# Patient Record
Sex: FEMALE | Race: WHITE | Employment: UNEMPLOYED | URBAN - NONMETROPOLITAN AREA
[De-identification: names, ages, dates, MRNs, and addresses within clinical notes are randomized per-mention and may not be internally consistent; named-entity substitution may affect disease eponyms.]

---

## 2021-10-08 ENCOUNTER — APPOINTMENT (OUTPATIENT)
Dept: CT IMAGING | Age: 81
DRG: 603 | End: 2021-10-08
Payer: MEDICARE

## 2021-10-08 ENCOUNTER — HOSPITAL ENCOUNTER (INPATIENT)
Age: 81
LOS: 3 days | Discharge: HOME | DRG: 603 | End: 2021-10-11
Attending: EMERGENCY MEDICINE | Admitting: INTERNAL MEDICINE
Payer: MEDICARE

## 2021-10-08 DIAGNOSIS — D72.819 LEUKOPENIA, UNSPECIFIED TYPE: ICD-10-CM

## 2021-10-08 DIAGNOSIS — D69.6 THROMBOCYTOPENIA (HCC): Primary | ICD-10-CM

## 2021-10-08 DIAGNOSIS — L03.211 FACIAL CELLULITIS: ICD-10-CM

## 2021-10-08 PROBLEM — A46 ERYSIPELAS: Status: ACTIVE | Noted: 2021-10-08

## 2021-10-08 LAB
ALBUMIN SERPL-MCNC: 4.1 G/DL (ref 3.5–5.1)
ALP BLD-CCNC: 55 U/L (ref 38–126)
ALT SERPL-CCNC: 12 U/L (ref 11–66)
ANION GAP SERPL CALCULATED.3IONS-SCNC: 12 MEQ/L (ref 8–16)
AST SERPL-CCNC: 17 U/L (ref 5–40)
BASOPHILS # BLD: 0.3 %
BASOPHILS ABSOLUTE: 0 THOU/MM3 (ref 0–0.1)
BILIRUB SERPL-MCNC: 0.5 MG/DL (ref 0.3–1.2)
BILIRUBIN DIRECT: < 0.2 MG/DL (ref 0–0.3)
BUN BLDV-MCNC: 15 MG/DL (ref 7–22)
C-REACTIVE PROTEIN: 11 MG/DL (ref 0–1)
CALCIUM SERPL-MCNC: 9.1 MG/DL (ref 8.5–10.5)
CHLORIDE BLD-SCNC: 101 MEQ/L (ref 98–111)
CO2: 24 MEQ/L (ref 23–33)
CREAT SERPL-MCNC: 0.7 MG/DL (ref 0.4–1.2)
EOSINOPHIL # BLD: 0.3 %
EOSINOPHILS ABSOLUTE: 0 THOU/MM3 (ref 0–0.4)
ERYTHROCYTE [DISTWIDTH] IN BLOOD BY AUTOMATED COUNT: 13 % (ref 11.5–14.5)
ERYTHROCYTE [DISTWIDTH] IN BLOOD BY AUTOMATED COUNT: 45.5 FL (ref 35–45)
GFR SERPL CREATININE-BSD FRML MDRD: 81 ML/MIN/1.73M2
GLUCOSE BLD-MCNC: 101 MG/DL (ref 70–108)
HCT VFR BLD CALC: 37.4 % (ref 37–47)
HEMOGLOBIN: 12.3 GM/DL (ref 12–16)
IMMATURE GRANS (ABS): 0.01 THOU/MM3 (ref 0–0.07)
IMMATURE GRANULOCYTES: 0.3 %
LACTIC ACID: 1 MMOL/L (ref 0.5–2)
LYMPHOCYTES # BLD: 16.7 %
LYMPHOCYTES ABSOLUTE: 0.5 THOU/MM3 (ref 1–4.8)
MCH RBC QN AUTO: 31.5 PG (ref 26–33)
MCHC RBC AUTO-ENTMCNC: 32.9 GM/DL (ref 32.2–35.5)
MCV RBC AUTO: 95.9 FL (ref 81–99)
MONOCYTES # BLD: 10.8 %
MONOCYTES ABSOLUTE: 0.3 THOU/MM3 (ref 0.4–1.3)
NUCLEATED RED BLOOD CELLS: 0 /100 WBC
OSMOLALITY CALCULATION: 274.8 MOSMOL/KG (ref 275–300)
PLATELET # BLD: 88 THOU/MM3 (ref 130–400)
PLATELET ESTIMATE: ABNORMAL
PMV BLD AUTO: 11.7 FL (ref 9.4–12.4)
POTASSIUM REFLEX MAGNESIUM: 3.7 MEQ/L (ref 3.5–5.2)
RBC # BLD: 3.9 MILL/MM3 (ref 4.2–5.4)
SEDIMENTATION RATE, ERYTHROCYTE: 29 MM/HR (ref 0–20)
SEG NEUTROPHILS: 71.6 %
SEGMENTED NEUTROPHILS ABSOLUTE COUNT: 2.1 THOU/MM3 (ref 1.8–7.7)
SODIUM BLD-SCNC: 137 MEQ/L (ref 135–145)
TOTAL PROTEIN: 7.2 G/DL (ref 6.1–8)
TSH SERPL DL<=0.05 MIU/L-ACNC: 0.99 UIU/ML (ref 0.4–4.2)
WBC # BLD: 2.9 THOU/MM3 (ref 4.8–10.8)

## 2021-10-08 PROCEDURE — 85651 RBC SED RATE NONAUTOMATED: CPT

## 2021-10-08 PROCEDURE — 1200000000 HC SEMI PRIVATE

## 2021-10-08 PROCEDURE — 6370000000 HC RX 637 (ALT 250 FOR IP): Performed by: INTERNAL MEDICINE

## 2021-10-08 PROCEDURE — 86140 C-REACTIVE PROTEIN: CPT

## 2021-10-08 PROCEDURE — 99284 EMERGENCY DEPT VISIT MOD MDM: CPT

## 2021-10-08 PROCEDURE — 96375 TX/PRO/DX INJ NEW DRUG ADDON: CPT

## 2021-10-08 PROCEDURE — 99223 1ST HOSP IP/OBS HIGH 75: CPT | Performed by: INTERNAL MEDICINE

## 2021-10-08 PROCEDURE — 80048 BASIC METABOLIC PNL TOTAL CA: CPT

## 2021-10-08 PROCEDURE — 6360000002 HC RX W HCPCS: Performed by: STUDENT IN AN ORGANIZED HEALTH CARE EDUCATION/TRAINING PROGRAM

## 2021-10-08 PROCEDURE — 6370000000 HC RX 637 (ALT 250 FOR IP): Performed by: EMERGENCY MEDICINE

## 2021-10-08 PROCEDURE — 80076 HEPATIC FUNCTION PANEL: CPT

## 2021-10-08 PROCEDURE — 2500000003 HC RX 250 WO HCPCS: Performed by: INTERNAL MEDICINE

## 2021-10-08 PROCEDURE — 2500000003 HC RX 250 WO HCPCS: Performed by: STUDENT IN AN ORGANIZED HEALTH CARE EDUCATION/TRAINING PROGRAM

## 2021-10-08 PROCEDURE — 84443 ASSAY THYROID STIM HORMONE: CPT

## 2021-10-08 PROCEDURE — 6360000004 HC RX CONTRAST MEDICATION: Performed by: EMERGENCY MEDICINE

## 2021-10-08 PROCEDURE — 70450 CT HEAD/BRAIN W/O DYE: CPT

## 2021-10-08 PROCEDURE — 70491 CT SOFT TISSUE NECK W/DYE: CPT

## 2021-10-08 PROCEDURE — 83605 ASSAY OF LACTIC ACID: CPT

## 2021-10-08 PROCEDURE — 85025 COMPLETE CBC W/AUTO DIFF WBC: CPT

## 2021-10-08 PROCEDURE — 87040 BLOOD CULTURE FOR BACTERIA: CPT

## 2021-10-08 PROCEDURE — 96374 THER/PROPH/DIAG INJ IV PUSH: CPT

## 2021-10-08 PROCEDURE — 2580000003 HC RX 258: Performed by: INTERNAL MEDICINE

## 2021-10-08 PROCEDURE — 36415 COLL VENOUS BLD VENIPUNCTURE: CPT

## 2021-10-08 RX ORDER — ACETAMINOPHEN 500 MG
1000 TABLET ORAL ONCE
Status: COMPLETED | OUTPATIENT
Start: 2021-10-08 | End: 2021-10-08

## 2021-10-08 RX ORDER — DONEPEZIL HYDROCHLORIDE 10 MG/1
10 TABLET, FILM COATED ORAL NIGHTLY
COMMUNITY

## 2021-10-08 RX ORDER — METHYLPREDNISOLONE SODIUM SUCCINATE 125 MG/2ML
125 INJECTION, POWDER, LYOPHILIZED, FOR SOLUTION INTRAMUSCULAR; INTRAVENOUS ONCE
Status: DISCONTINUED | OUTPATIENT
Start: 2021-10-08 | End: 2021-10-08

## 2021-10-08 RX ORDER — POTASSIUM CHLORIDE 20 MEQ/1
20 TABLET, EXTENDED RELEASE ORAL DAILY
Status: DISCONTINUED | OUTPATIENT
Start: 2021-10-09 | End: 2021-10-11 | Stop reason: HOSPADM

## 2021-10-08 RX ORDER — EZETIMIBE 10 MG/1
10 TABLET ORAL DAILY
COMMUNITY

## 2021-10-08 RX ORDER — ACETAMINOPHEN 325 MG/1
650 TABLET ORAL EVERY 6 HOURS PRN
Status: DISCONTINUED | OUTPATIENT
Start: 2021-10-08 | End: 2021-10-11 | Stop reason: HOSPADM

## 2021-10-08 RX ORDER — SODIUM CHLORIDE 9 MG/ML
25 INJECTION, SOLUTION INTRAVENOUS PRN
Status: DISCONTINUED | OUTPATIENT
Start: 2021-10-08 | End: 2021-10-11 | Stop reason: HOSPADM

## 2021-10-08 RX ORDER — DIPHENHYDRAMINE HYDROCHLORIDE 50 MG/ML
25 INJECTION INTRAMUSCULAR; INTRAVENOUS ONCE
Status: COMPLETED | OUTPATIENT
Start: 2021-10-08 | End: 2021-10-08

## 2021-10-08 RX ORDER — POLYETHYLENE GLYCOL 3350 17 G/17G
17 POWDER, FOR SOLUTION ORAL DAILY PRN
Status: DISCONTINUED | OUTPATIENT
Start: 2021-10-08 | End: 2021-10-11 | Stop reason: HOSPADM

## 2021-10-08 RX ORDER — SODIUM CHLORIDE 0.9 % (FLUSH) 0.9 %
10 SYRINGE (ML) INJECTION EVERY 12 HOURS SCHEDULED
Status: DISCONTINUED | OUTPATIENT
Start: 2021-10-08 | End: 2021-10-11 | Stop reason: HOSPADM

## 2021-10-08 RX ORDER — CHLORAL HYDRATE 500 MG
1000 CAPSULE ORAL DAILY
COMMUNITY

## 2021-10-08 RX ORDER — ONDANSETRON 4 MG/1
4 TABLET, ORALLY DISINTEGRATING ORAL EVERY 8 HOURS PRN
Status: DISCONTINUED | OUTPATIENT
Start: 2021-10-08 | End: 2021-10-11 | Stop reason: HOSPADM

## 2021-10-08 RX ORDER — TORSEMIDE 20 MG/1
20 TABLET ORAL DAILY
Status: DISCONTINUED | OUTPATIENT
Start: 2021-10-09 | End: 2021-10-11 | Stop reason: HOSPADM

## 2021-10-08 RX ORDER — ACETAMINOPHEN 650 MG/1
650 SUPPOSITORY RECTAL EVERY 6 HOURS PRN
Status: DISCONTINUED | OUTPATIENT
Start: 2021-10-08 | End: 2021-10-11 | Stop reason: HOSPADM

## 2021-10-08 RX ORDER — EZETIMIBE 10 MG/1
10 TABLET ORAL DAILY
Status: DISCONTINUED | OUTPATIENT
Start: 2021-10-09 | End: 2021-10-08

## 2021-10-08 RX ORDER — SERTRALINE HYDROCHLORIDE 100 MG/1
100 TABLET, FILM COATED ORAL DAILY
Status: DISCONTINUED | OUTPATIENT
Start: 2021-10-09 | End: 2021-10-11 | Stop reason: HOSPADM

## 2021-10-08 RX ORDER — DONEPEZIL HYDROCHLORIDE 10 MG/1
10 TABLET, FILM COATED ORAL NIGHTLY
Status: DISCONTINUED | OUTPATIENT
Start: 2021-10-08 | End: 2021-10-11 | Stop reason: HOSPADM

## 2021-10-08 RX ORDER — PANTOPRAZOLE SODIUM 40 MG/1
40 TABLET, DELAYED RELEASE ORAL
Status: DISCONTINUED | OUTPATIENT
Start: 2021-10-09 | End: 2021-10-11 | Stop reason: HOSPADM

## 2021-10-08 RX ORDER — ROSUVASTATIN CALCIUM 10 MG/1
5 TABLET, COATED ORAL
Status: DISCONTINUED | OUTPATIENT
Start: 2021-10-11 | End: 2021-10-11 | Stop reason: HOSPADM

## 2021-10-08 RX ORDER — ROSUVASTATIN CALCIUM 5 MG/1
5 TABLET, COATED ORAL
COMMUNITY

## 2021-10-08 RX ORDER — AMLODIPINE BESYLATE 10 MG/1
10 TABLET ORAL DAILY
Status: DISCONTINUED | OUTPATIENT
Start: 2021-10-09 | End: 2021-10-11 | Stop reason: HOSPADM

## 2021-10-08 RX ORDER — ASPIRIN 81 MG/1
81 TABLET, CHEWABLE ORAL DAILY
Status: DISCONTINUED | OUTPATIENT
Start: 2021-10-09 | End: 2021-10-11 | Stop reason: HOSPADM

## 2021-10-08 RX ORDER — RANITIDINE 150 MG/1
150 TABLET ORAL 2 TIMES DAILY
COMMUNITY

## 2021-10-08 RX ORDER — ONDANSETRON 2 MG/ML
4 INJECTION INTRAMUSCULAR; INTRAVENOUS EVERY 6 HOURS PRN
Status: DISCONTINUED | OUTPATIENT
Start: 2021-10-08 | End: 2021-10-11 | Stop reason: HOSPADM

## 2021-10-08 RX ORDER — SODIUM CHLORIDE 0.9 % (FLUSH) 0.9 %
10 SYRINGE (ML) INJECTION PRN
Status: DISCONTINUED | OUTPATIENT
Start: 2021-10-08 | End: 2021-10-11 | Stop reason: HOSPADM

## 2021-10-08 RX ADMIN — ACETAMINOPHEN 1000 MG: 500 TABLET ORAL at 13:08

## 2021-10-08 RX ADMIN — DONEPEZIL HYDROCHLORIDE 10 MG: 10 TABLET, FILM COATED ORAL at 22:41

## 2021-10-08 RX ADMIN — DIPHENHYDRAMINE HYDROCHLORIDE 25 MG: 50 INJECTION, SOLUTION INTRAMUSCULAR; INTRAVENOUS at 12:59

## 2021-10-08 RX ADMIN — IOPAMIDOL 80 ML: 755 INJECTION, SOLUTION INTRAVENOUS at 15:09

## 2021-10-08 RX ADMIN — NAFCILLIN SODIUM 1000 MG: 1 INJECTION, POWDER, FOR SOLUTION INTRAMUSCULAR; INTRAVENOUS at 22:41

## 2021-10-08 RX ADMIN — ACETAMINOPHEN 650 MG: 325 TABLET ORAL at 22:51

## 2021-10-08 RX ADMIN — FAMOTIDINE 20 MG: 10 INJECTION, SOLUTION INTRAVENOUS at 12:59

## 2021-10-08 RX ADMIN — SODIUM CHLORIDE, PRESERVATIVE FREE 10 ML: 5 INJECTION INTRAVENOUS at 22:42

## 2021-10-08 ASSESSMENT — ENCOUNTER SYMPTOMS
DIARRHEA: 0
SORE THROAT: 0
VOMITING: 0
EYE REDNESS: 0
RHINORRHEA: 0
RESPIRATORY NEGATIVE: 1
BACK PAIN: 0
GASTROINTESTINAL NEGATIVE: 1
SHORTNESS OF BREATH: 0
EYES NEGATIVE: 1
SINUS PAIN: 0
COLOR CHANGE: 0
COUGH: 0
FACIAL SWELLING: 1
NAUSEA: 0
ABDOMINAL PAIN: 0

## 2021-10-08 ASSESSMENT — PAIN SCALES - GENERAL
PAINLEVEL_OUTOF10: 8
PAINLEVEL_OUTOF10: 3
PAINLEVEL_OUTOF10: 8

## 2021-10-08 ASSESSMENT — PAIN DESCRIPTION - PAIN TYPE: TYPE: ACUTE PAIN

## 2021-10-08 ASSESSMENT — PAIN DESCRIPTION - LOCATION: LOCATION: FACE;HEAD

## 2021-10-08 ASSESSMENT — PAIN DESCRIPTION - DESCRIPTORS: DESCRIPTORS: ACHING

## 2021-10-08 NOTE — H&P
HISTORY AND PHYSICAL             Date: 10/8/2021        Patient Name: Tiffany Rudd     YOB: 1940      Age:  80 y.o. Chief Complaint     Chief Complaint   Patient presents with    Facial Swelling    Fever      R facial swelling and redness     History Obtained From   patient    History of Present Illness   Tiffany Rudd is a 80 y.o. female with past medical history of hypertension, hyperlipidemia, who presents to the emergency department for evaluation of fever the past 3 days, she has some weakness 2 days ago was seen in outlying emergency department underwent imaging of her head as well as her chest.  Had a negative work-up per family at bedside and was discharged. However this a.m. has had bilateral facial pain with swelling to the right side of her face and some redness has been progressively worsening throughout the day. She denies any new medications new food new sheets or other new clothing at home.     CT head  without contrast on 10/6/2021 showed no acute intracranial hemorrhage or territorial infarct. Mild to moderate chronic vessel disease with mild generalized cerebral volume loss. She also underwent a ultrasound of her carotid with Doppler showing very minimal plaque formation in the cervical carotid systems bilaterally less than 20% stenosis of the internal carotid arteries on either side by NASCET Doppler criteria. Past Medical History     Past Medical History:   Diagnosis Date    Anxiety     Arthritis     Cataracts, bilateral     GERD (gastroesophageal reflux disease)     Hyperlipidemia     Hypertension     Sleep apnea         Past Surgical History     Past Surgical History:   Procedure Laterality Date    BREAST BIOPSY      right    COLON SURGERY      colon resection due to cancerous polyp stage 1    COLONOSCOPY          Medications Prior to Admission     Prior to Admission medications    Medication Sig Start Date End Date Taking?  Authorizing Provider   Multiple Vitamin (MULTIVITAMIN ADULT PO) Take 1 tablet by mouth   Yes Historical Provider, MD   Omega-3 Fatty Acids (FISH OIL) 1000 MG CAPS Take 1,000 mg by mouth daily   Yes Historical Provider, MD   donepezil (ARICEPT) 10 MG tablet Take 10 mg by mouth nightly   Yes Historical Provider, MD   raNITIdine (RANITIDINE 150 MAX STRENGTH) 150 MG tablet Take 150 mg by mouth 2 times daily   Yes Historical Provider, MD   ezetimibe (ZETIA) 10 MG tablet Take 10 mg by mouth daily   Yes Historical Provider, MD   rosuvastatin (CRESTOR) 5 MG tablet Take 5 mg by mouth Twice a Week   Yes Historical Provider, MD   torsemide (DEMADEX) 20 MG tablet Take 20 mg by mouth daily   Yes Historical Provider, MD   POTASSIUM CHLORIDE PO Take 20 mEq by mouth daily   Yes Historical Provider, MD   amLODIPine (NORVASC) 10 MG tablet Take 10 mg by mouth daily   Yes Historical Provider, MD   sertraline (ZOLOFT) 50 MG tablet Take 100 mg by mouth daily    Yes Historical Provider, MD   calcium carbonate 600 MG TABS tablet Take 2 tablets by mouth daily   Yes Historical Provider, MD   aspirin 81 MG tablet Take 81 mg by mouth daily   Yes Historical Provider, MD   MULTIPLE VITAMIN PO Take by mouth daily   Yes Historical Provider, MD   omeprazole (PRILOSEC) 20 MG delayed release capsule Take 20 mg by mouth daily   Yes Historical Provider, MD   loratadine (CLARITIN) 10 MG capsule Take 10 mg by mouth daily as needed   Yes Historical Provider, MD   bisacodyl (DULCOLAX) 5 MG EC tablet See Prep Instructions 5/8/17  Yes BRISA Bentley CNP   polyethylene glycol (GLYCOLAX) powder Colonoscopy Prep Dispense 255 Gram Bottle.   Use as Directed 5/8/17  Yes BRISA Bentley CNP        Allergies   Morphine    Social History     Social History     Tobacco History     Smoking Status  Never Smoker    Smokeless Tobacco Use  Never Used          Alcohol History     Alcohol Use Status  No          Drug Use     Drug Use Status  No          Sexual Activity     Sexually Active  Not Asked                Family History     Family History   Problem Relation Age of Onset    Heart Attack Mother     Heart Disease Father     Other Father         pneumonia    Liver Disease Sister     Heart Attack Brother        Review of Systems   Review of Systems   Constitutional: Positive for fatigue and fever. HENT: Negative. Eyes: Negative. Respiratory: Negative. Cardiovascular: Negative. Gastrointestinal: Negative. Endocrine: Negative. Genitourinary: Negative. Musculoskeletal: Positive for arthralgias. Skin: Positive for rash. Negative for color change. Neurological: Positive for dizziness and weakness. Hematological: Positive for adenopathy. Psychiatric/Behavioral: Negative. Physical Exam   BP (!) 120/54   Pulse 73   Temp 100.3 °F (37.9 °C) (Oral)   Resp 17   Ht 5' 4\" (1.626 m)   Wt 190 lb (86.2 kg)   SpO2 95%   BMI 32.61 kg/m²     Physical Exam  Vitals reviewed. Constitutional:       Appearance: Normal appearance. She is obese. HENT:      Head: Normocephalic and atraumatic. Right Ear: External ear normal.      Left Ear: External ear normal.      Nose: Nose normal.      Mouth/Throat:      Mouth: Mucous membranes are moist.      Pharynx: Oropharynx is clear. Eyes:      Extraocular Movements: Extraocular movements intact. Pupils: Pupils are equal, round, and reactive to light. Cardiovascular:      Rate and Rhythm: Normal rate. Pulses: Normal pulses. Pulmonary:      Effort: Pulmonary effort is normal.   Abdominal:      General: Bowel sounds are normal.      Palpations: Abdomen is soft. Musculoskeletal:         General: Normal range of motion. Cervical back: Normal range of motion. Skin:     General: Skin is warm. Capillary Refill: Capillary refill takes less than 2 seconds.       Comments: Mild erythema with minimal swelling noted i on both  sides of the face, right greater than the left mild swelling noted below the right eye with warmth and tenderness noted, no lymph nodes are palpable but tenderness noted in the submandibular region bilaterally   Neurological:      Mental Status: She is alert. Mental status is at baseline.    Psychiatric:         Mood and Affect: Mood normal.         Labs      Recent Results (from the past 24 hour(s))   Lactic acid, plasma    Collection Time: 10/08/21 12:55 PM   Result Value Ref Range    Lactic Acid 1.0 0.5 - 2.0 mmol/L   CBC Auto Differential    Collection Time: 10/08/21 12:56 PM   Result Value Ref Range    WBC 2.9 (L) 4.8 - 10.8 thou/mm3    RBC 3.90 (L) 4.20 - 5.40 mill/mm3    Hemoglobin 12.3 12.0 - 16.0 gm/dl    Hematocrit 37.4 37.0 - 47.0 %    MCV 95.9 81.0 - 99.0 fL    MCH 31.5 26.0 - 33.0 pg    MCHC 32.9 32.2 - 35.5 gm/dl    RDW-CV 13.0 11.5 - 14.5 %    RDW-SD 45.5 (H) 35.0 - 45.0 fL    Platelets 88 (L) 397 - 400 thou/mm3    MPV 11.7 9.4 - 12.4 fL    Seg Neutrophils 71.6 %    Lymphocytes 16.7 %    Monocytes 10.8 %    Eosinophils 0.3 %    Basophils 0.3 %    Immature Granulocytes 0.3 %    Platelet Estimate SL DECREASED Adequate    Segs Absolute 2.1 1 - 7 thou/mm3    Lymphocytes Absolute 0.5 (L) 1.0 - 4.8 thou/mm3    Monocytes Absolute 0.3 (L) 0.4 - 1.3 thou/mm3    Eosinophils Absolute 0.0 0.0 - 0.4 thou/mm3    Basophils Absolute 0.0 0.0 - 0.1 thou/mm3    Immature Grans (Abs) 0.01 0.00 - 0.07 thou/mm3    nRBC 0 /100 wbc   Basic Metabolic Panel w/ Reflex to MG    Collection Time: 10/08/21 12:56 PM   Result Value Ref Range    Sodium 137 135 - 145 meq/L    Potassium reflex Magnesium 3.7 3.5 - 5.2 meq/L    Chloride 101 98 - 111 meq/L    CO2 24 23 - 33 meq/L    Glucose 101 70 - 108 mg/dL    BUN 15 7 - 22 mg/dL    CREATININE 0.7 0.4 - 1.2 mg/dL    Calcium 9.1 8.5 - 10.5 mg/dL   Sedimentation Rate    Collection Time: 10/08/21 12:56 PM   Result Value Ref Range    Sed Rate 29 (H) 0 - 20 mm/hr   C-reactive protein    Collection Time: 10/08/21 12:56 PM   Result Value Ref Range    CRP 11.00 (H) 0.00 - 1.00 mg/dl   Hepatic Function Panel    Collection Time: 10/08/21 12:56 PM   Result Value Ref Range    Albumin 4.1 3.5 - 5.1 g/dL    Total Bilirubin 0.5 0.3 - 1.2 mg/dL    Bilirubin, Direct <0.2 0.0 - 0.3 mg/dL    Alkaline Phosphatase 55 38 - 126 U/L    AST 17 5 - 40 U/L    ALT 12 11 - 66 U/L    Total Protein 7.2 6.1 - 8.0 g/dL   TSH with Reflex    Collection Time: 10/08/21 12:56 PM   Result Value Ref Range    TSH 0.990 0.400 - 4.200 uIU/mL   Anion Gap    Collection Time: 10/08/21 12:56 PM   Result Value Ref Range    Anion Gap 12.0 8.0 - 16.0 meq/L   Osmolality    Collection Time: 10/08/21 12:56 PM   Result Value Ref Range    Osmolality Calc 274.8 (L) 275.0 - 300.0 mOsmol/kg   Glomerular Filtration Rate, Estimated    Collection Time: 10/08/21 12:56 PM   Result Value Ref Range    Est, Glom Filt Rate 81 (A) ml/min/1.73m2        Imaging/Diagnostics Last 24 Hours   CT Head WO Contrast    Result Date: 10/8/2021  PROCEDURE: CT HEAD WO CONTRAST CLINICAL INFORMATION: headache . TECHNIQUE: 2-D multiplanar noncontrast CT brain at 5 mm intervals. All CT scans at this facility use dose modulation, iterative reconstruction, and/or weight-based dosing when appropriate to reduce radiation dose to as low as reasonably achievable. COMPARISON: No prior study. FINDINGS: There is no hemorrhage. There is no infarction. There is no extra-axial fluid collection. Minimal bilateral subcortical white matter diminished attenuation. Ventricles are normal. Calvarium is intact. There is right periorbital soft tissue swelling. There is no intraorbital abnormality. The globe is intact. Visualized paranasal sinuses and mastoid air cells are clear. No acute intracranial pathology. Minimal right preorbital soft tissue swelling. **This report has been created using voice recognition software. It may contain minor errors which are inherent in voice recognition technology. ** Final report electronically signed by Dr. Batsheva Bautista on 10/8/2021 3:21 PM    CT SOFT TISSUE NECK W CONTRAST    Result Date: 10/8/2021  PROCEDURE: CT SOFT TISSUE NECK W CONTRAST CLINICAL INFORMATION: right sided facial swelling and redness . TECHNIQUE: 2-D multiplanar postcontrast images of the neck with Isovue-370 IV contrast 3 mm intervals. All CT scans at this facility use dose modulation, iterative reconstruction, and/or weight-based dosing when appropriate to reduce radiation dose to as low as reasonably achievable. COMPARISON: No prior study. FINDINGS: Minimal preorbital and supraorbital soft tissue swelling. Images of the base of the brain are unremarkable. Nasopharynx oropharynx and hypopharynx are unremarkable. There is marked thyromegaly with large left thyroid lobe which is intrathoracic extending into the mediastinum. Heterogeneous attenuation suggesting presence of cysts or nodules. This measures 6.8 cm in cephalocaudal extent and up to 2.6 cm in transverse  dimension. Epiglottis and aryepiglottic folds are normal. Parotid and submandibular glands are unremarkable. There are numerous nonenlarged cervical lymph nodes. Limited images of the upper chest show no infiltrates. No mediastinal or hilar adenopathy is seen although the hilar are incompletely imaged. No suspicious bone lesions     Minimal periorbital soft tissue swelling on the right. 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 **This report has been created using voice recognition software. It may contain minor errors which are inherent in voice recognition technology. ** Final report electronically signed by Dr. Jw Campbell on 10/8/2021 3:25 PM      Assessment / plan      1. Facial cellulitis/erysipelas   - Will start on IV nafcillin every 4 hours and monitor, ID consult has   already been placed, will discuss  2. Mild cognitive impairment   -Continue Aricept  3. Hypertension   -Under fair control continue amlodipine  4. Hyperlipidemia   -Continue Zetia and lovastatin  5.

## 2021-10-08 NOTE — ED TRIAGE NOTES
Patient to ED c/o facial swelling and fever. Patient reports she has had consistent fevers for a couple weeks now. Patient states she was sent here by her PCP for the facial swelling that started today. Patient daughter states she was taken to [facility] for various testing with no answers. Patient complains of pain in the face and head rating the pain 8/10 and describing it as an ache.

## 2021-10-08 NOTE — ED PROVIDER NOTES
neck laterally  Chest:  CTAB    HRRR no mcg  Abd:      SNT/ND; No rebound/guarding/rigidity. Neg McBurneys  Extrem: No edema, neg homans.   Neuro:   Alert, Awake, no lateralizing deficts               CN's grossly intact bilaterally    DIAGNOSTIC RESULTS   RADIOLOGY:   CT Head WO Contrast    (Results Pending)   CT SOFT TISSUE NECK W CONTRAST    (Results Pending)   CT MAXILLOFACIAL W CONTRAST    (Results Pending)       LABS:  Labs Reviewed   CULTURE, BLOOD 1   CULTURE, BLOOD 2   CBC WITH AUTO DIFFERENTIAL   BASIC METABOLIC PANEL W/ REFLEX TO MG FOR LOW K   SEDIMENTATION RATE   C-REACTIVE PROTEIN   URINE RT REFLEX TO CULTURE   HEPATIC FUNCTION PANEL   TSH WITH REFLEX   LACTIC ACID, PLASMA       EMERGENCY DEPARTMENT COURSE:        Medical Decision-Making:   Infectious etiology   Consider sinusitis, mastoiditis, soft tissue infexjanneth, Trevon's    [place], [address]  Attending Emergency Physician        Alphonse Moreland DO  10/08/21 3525

## 2021-10-08 NOTE — ED NOTES
Pt resting in bed, awaiting inpatient bed. Denies any needs at this time.       Aaron Kaye RN  10/08/21 0392

## 2021-10-08 NOTE — ED PROVIDER NOTES
[facility] ENCOUNTER          Pt Name: Socorro Abrams  MRN: [id number]  Birthdate 1940  Date of evaluation: 10/8/2021  Treating Resident Physician: Oneil Nevarez MD  Supervising Physician: Dr Andrey Lui       Chief Complaint   Patient presents with    Facial Swelling    Fever     History obtained from the patient. HISTORY OF PRESENT ILLNESS    HPI  Socorro Abrams is a 80 y.o. female with past medical history of hypertension, hyperlipidemia, who presents to the emergency department for evaluation of fever the past 3 days, she has some weakness 2 days ago was seen in outlying emergency department underwent imaging of her head as well as her chest.  Had a negative work-up per family at bedside and was discharged. However this a.m. has had bilateral facial pain with swelling to the right side of her face and some redness has been progressively worsening throughout the day. She denies any new medications new food new sheets or other new clothing at home. CT head  without contrast on 10/6/2021 showed no acute intracranial hemorrhage or territorial infarct. Mild to moderate chronic vessel disease with mild generalized cerebral volume loss. She also underwent a ultrasound of her carotid with Doppler showing very minimal plaque formation in the cervical carotid systems bilaterally less than 20% stenosis of the internal carotid arteries on either side by NASCET Doppler criteria. The patient has no other acute complaints at this time. REVIEW OF SYSTEMS   Review of Systems   Constitutional: Positive for fatigue and fever. Negative for chills. HENT: Positive for facial swelling. Negative for rhinorrhea, sinus pain and sore throat. Eyes: Negative for redness. Respiratory: Negative for cough and shortness of breath. Cardiovascular: Negative for chest pain.    Gastrointestinal: Negative for abdominal pain, diarrhea, nausea and vomiting. Genitourinary: Negative for dysuria. Musculoskeletal: Negative for back pain. Skin: Negative for rash. Neurological: Negative for light-headedness and headaches. Psychiatric/Behavioral: Negative for agitation. PAST MEDICAL AND SURGICAL HISTORY     Past Medical History:   Diagnosis Date    Anxiety     Arthritis     Cataracts, bilateral     GERD (gastroesophageal reflux disease)     Hyperlipidemia     Hypertension     Sleep apnea      Past Surgical History:   Procedure Laterality Date    BREAST BIOPSY      right    COLON SURGERY      colon resection due to cancerous polyp stage 1    COLONOSCOPY           MEDICATIONS   No current facility-administered medications for this encounter.     Current Outpatient Medications:     Multiple Vitamin (MULTIVITAMIN ADULT PO), Take 1 tablet by mouth, Disp: , Rfl:     Omega-3 Fatty Acids (FISH OIL) 1000 MG CAPS, Take 1,000 mg by mouth daily, Disp: , Rfl:     donepezil (ARICEPT) 10 MG tablet, Take 10 mg by mouth nightly, Disp: , Rfl:     raNITIdine (RANITIDINE 150 MAX STRENGTH) 150 MG tablet, Take 150 mg by mouth 2 times daily, Disp: , Rfl:     ezetimibe (ZETIA) 10 MG tablet, Take 10 mg by mouth daily, Disp: , Rfl:     rosuvastatin (CRESTOR) 5 MG tablet, Take 5 mg by mouth Twice a Week, Disp: , Rfl:     torsemide (DEMADEX) 20 MG tablet, Take 20 mg by mouth daily, Disp: , Rfl:     POTASSIUM CHLORIDE PO, Take 20 mEq by mouth daily, Disp: , Rfl:     amLODIPine (NORVASC) 10 MG tablet, Take 10 mg by mouth daily, Disp: , Rfl:     sertraline (ZOLOFT) 50 MG tablet, Take 100 mg by mouth daily , Disp: , Rfl:     calcium carbonate 600 MG TABS tablet, Take 2 tablets by mouth daily, Disp: , Rfl:     aspirin 81 MG tablet, Take 81 mg by mouth daily, Disp: , Rfl:     MULTIPLE VITAMIN PO, Take by mouth daily, Disp: , Rfl:     omeprazole (PRILOSEC) 20 MG delayed release capsule, Take 20 mg by mouth daily, Disp: , Rfl:     loratadine no lip swelling, no palatal swelling. Uvula is midline. Eyes:      General: No scleral icterus. Conjunctiva/sclera: Conjunctivae normal.   Cardiovascular:      Rate and Rhythm: Normal rate and regular rhythm. Pulses: Normal pulses. Heart sounds: Normal heart sounds. Pulmonary:      Effort: Pulmonary effort is normal. No respiratory distress. Breath sounds: Normal breath sounds. Abdominal:      General: Abdomen is flat. There is no distension. Palpations: Abdomen is soft. Tenderness: There is no abdominal tenderness. There is no guarding or rebound. Musculoskeletal:         General: Normal range of motion. Cervical back: Normal range of motion and neck supple. No rigidity. No muscular tenderness. Lymphadenopathy:      Cervical: No cervical adenopathy. Skin:     General: Skin is warm and dry. Capillary Refill: Capillary refill takes less than 2 seconds. Coloration: Skin is not jaundiced. Neurological:      General: No focal deficit present. Mental Status: She is alert and oriented to person, place, and time. Psychiatric:         Mood and Affect: Mood normal.         Behavior: Behavior normal.         MEDICAL DECISION MAKING   Initial Assessment: This is a 81-year-old female past medical history of GERD hypertension, hyperlipidemia who has had some fevers of the past 3 days as well as some weakness beginning yesterday was initially evaluated in outlying facility on 10/6/2021 had a negative CT head and was found opposition no critical stenosis and had a chest x-ray was normal.  No significant findings on laboratory studies. Urine was negative. However her fatigue is continued to today. She is also developed some bilateral facial pain and some facial swelling on the right side including her eyelid as well as redness.     Differential Diagnosis Included but not limited to: Cellulitis abscess, deep neck infection, sepsis, Pretty-Omari's trigeminal nausea, shingles    MDM:   Patient symptoms appear likely for cellulitis however imaging shows no significant deep space infection at this time. Discussed the case with the infectious disease expert who advised Unasyn and admission for shortness and further management patient's elevated CRP ESR consistent with some sort of inflammatory response. Patient's family and patient herself was updated on these results as well as the need for admission. ED RESULTS   Laboratory results:  Labs Reviewed   CBC WITH AUTO DIFFERENTIAL - Abnormal; Notable for the following components:       Result Value    WBC 2.9 (*)     RBC 3.90 (*)     RDW-SD 45.5 (*)     Platelets 88 (*)     Lymphocytes Absolute 0.5 (*)     Monocytes Absolute 0.3 (*)     All other components within normal limits   SEDIMENTATION RATE - Abnormal; Notable for the following components:    Sed Rate 29 (*)     All other components within normal limits   C-REACTIVE PROTEIN - Abnormal; Notable for the following components:    CRP 11.00 (*)     All other components within normal limits   OSMOLALITY - Abnormal; Notable for the following components:    Osmolality Calc 274.8 (*)     All other components within normal limits   GLOMERULAR FILTRATION RATE, ESTIMATED - Abnormal; Notable for the following components:    Est, Glom Filt Rate 81 (*)     All other components within normal limits   CULTURE, BLOOD 1   CULTURE, BLOOD 2   BASIC METABOLIC PANEL W/ REFLEX TO MG FOR LOW K   LACTIC ACID, PLASMA   HEPATIC FUNCTION PANEL   TSH WITH REFLEX   ANION GAP   URINE RT REFLEX TO CULTURE       Radiologic studies results:  CT Head WO Contrast   Final Result   No acute intracranial pathology. Minimal right preorbital soft tissue swelling. **This report has been created using voice recognition software. It may contain minor errors which are inherent in voice recognition technology. **      Final report electronically signed by Dr. José Velásquez on 10/8/2021 3:21 PM for monitoring. [AL]   [phone] Patient family updated on imaging results as well as laboratory results. The hospitalist was contacted for admission. [AL]      ED Course User Index  [AL] Ciro Chappell MD         MEDICATION CHANGES     New Prescriptions    No medications on file         FINAL DISPOSITION     Final diagnoses: Thrombocytopenia (HCC)   Leukopenia, unspecified type   Facial cellulitis     Condition: condition: fair  Dispo: Admit to med/surg floor      This transcription was electronically signed. Parts of this transcriptions may have been dictated by use of voice recognition software and electronically transcribed, and parts may have been transcribed with the assistance of an ED scribe. The transcription may contain errors not detected in proofreading. Please refer to my supervising physician's documentation if my documentation differs.     Electronically Signed: Ciro Chappell MD, 10/08/21, 4:58 PM         Ciro Chappell MD  Resident  10/08/21 3572

## 2021-10-08 NOTE — ED NOTES
ED to inpatient nurses report    Chief Complaint   Patient presents with    Facial Swelling    Fever      Present to ED from home  LOC: alert and orientated to name, place, date  Vital signs   Vitals:    10/08/21 1148 10/08/21 1305 10/08/21 1320 10/08/21 1415   BP: 104/78 (!) 140/56 (!) 133/47 (!) 120/54   Pulse: 76 75 70 73   Resp: 18 16 16 17   Temp: 100.3 °F (37.9 °C)      TempSrc: Oral      SpO2: 96% 95% 96% 95%   Weight: 190 lb (86.2 kg)      Height: 5' 4\" (1.626 m)         Oxygen Baseline RA    Current needs required RA Bipap/Cpap No  LDAs:   Peripheral IV 10/08/21 Right Antecubital (Active)   Site Assessment Clean;Dry; Intact 10/08/21 1255   Line Status Brisk blood return;Flushed;Normal saline locked;Specimen collected 10/08/21 1255   Dressing Status Clean;Dry; Intact 10/08/21 1255   Dressing Intervention New 10/08/21 1255     Mobility: Requires assistance * 1  Pending ED orders:   Present condition: STABLE    Electronically signed by Joe Moran RN on 10/8/2021 at [address], [address] [place] [address]  10/08/21 1621

## 2021-10-09 LAB
ALBUMIN SERPL-MCNC: 4 G/DL (ref 3.5–5.1)
ALP BLD-CCNC: 56 U/L (ref 38–126)
ALT SERPL-CCNC: 14 U/L (ref 11–66)
ANION GAP SERPL CALCULATED.3IONS-SCNC: 13 MEQ/L (ref 8–16)
AST SERPL-CCNC: 18 U/L (ref 5–40)
BILIRUB SERPL-MCNC: 0.5 MG/DL (ref 0.3–1.2)
BUN BLDV-MCNC: 13 MG/DL (ref 7–22)
C-REACTIVE PROTEIN: 11.33 MG/DL (ref 0–1)
CALCIUM SERPL-MCNC: 8.9 MG/DL (ref 8.5–10.5)
CHLORIDE BLD-SCNC: 104 MEQ/L (ref 98–111)
CO2: 22 MEQ/L (ref 23–33)
CREAT SERPL-MCNC: 0.6 MG/DL (ref 0.4–1.2)
ERYTHROCYTE [DISTWIDTH] IN BLOOD BY AUTOMATED COUNT: 12.9 % (ref 11.5–14.5)
ERYTHROCYTE [DISTWIDTH] IN BLOOD BY AUTOMATED COUNT: 45.9 FL (ref 35–45)
GFR SERPL CREATININE-BSD FRML MDRD: > 90 ML/MIN/1.73M2
GLUCOSE BLD-MCNC: 126 MG/DL (ref 70–108)
HCT VFR BLD CALC: 39.6 % (ref 37–47)
HEMOGLOBIN: 12.9 GM/DL (ref 12–16)
MCH RBC QN AUTO: 31.2 PG (ref 26–33)
MCHC RBC AUTO-ENTMCNC: 32.6 GM/DL (ref 32.2–35.5)
MCV RBC AUTO: 95.9 FL (ref 81–99)
PLATELET # BLD: 106 THOU/MM3 (ref 130–400)
PMV BLD AUTO: 11.5 FL (ref 9.4–12.4)
POTASSIUM REFLEX MAGNESIUM: 3.6 MEQ/L (ref 3.5–5.2)
RBC # BLD: 4.13 MILL/MM3 (ref 4.2–5.4)
SEDIMENTATION RATE, ERYTHROCYTE: 32 MM/HR (ref 0–20)
SODIUM BLD-SCNC: 139 MEQ/L (ref 135–145)
TOTAL PROTEIN: 7.3 G/DL (ref 6.1–8)
WBC # BLD: 3.5 THOU/MM3 (ref 4.8–10.8)

## 2021-10-09 PROCEDURE — 36415 COLL VENOUS BLD VENIPUNCTURE: CPT

## 2021-10-09 PROCEDURE — 1200000000 HC SEMI PRIVATE

## 2021-10-09 PROCEDURE — 86140 C-REACTIVE PROTEIN: CPT

## 2021-10-09 PROCEDURE — 2580000003 HC RX 258: Performed by: INTERNAL MEDICINE

## 2021-10-09 PROCEDURE — 80053 COMPREHEN METABOLIC PANEL: CPT

## 2021-10-09 PROCEDURE — 85651 RBC SED RATE NONAUTOMATED: CPT

## 2021-10-09 PROCEDURE — 85027 COMPLETE CBC AUTOMATED: CPT

## 2021-10-09 PROCEDURE — 6370000000 HC RX 637 (ALT 250 FOR IP): Performed by: INTERNAL MEDICINE

## 2021-10-09 PROCEDURE — 2500000003 HC RX 250 WO HCPCS: Performed by: INTERNAL MEDICINE

## 2021-10-09 PROCEDURE — 6360000002 HC RX W HCPCS: Performed by: INTERNAL MEDICINE

## 2021-10-09 PROCEDURE — 99233 SBSQ HOSP IP/OBS HIGH 50: CPT | Performed by: INTERNAL MEDICINE

## 2021-10-09 RX ADMIN — AMPICILLIN SODIUM AND SULBACTAM SODIUM 1500 MG: 1; .5 INJECTION, POWDER, FOR SOLUTION INTRAMUSCULAR; INTRAVENOUS at 21:12

## 2021-10-09 RX ADMIN — SERTRALINE 100 MG: 100 TABLET, FILM COATED ORAL at 08:11

## 2021-10-09 RX ADMIN — AMLODIPINE BESYLATE 10 MG: 10 TABLET ORAL at 08:11

## 2021-10-09 RX ADMIN — PANTOPRAZOLE SODIUM 40 MG: 40 TABLET, DELAYED RELEASE ORAL at 06:50

## 2021-10-09 RX ADMIN — TORSEMIDE 20 MG: 20 TABLET ORAL at 08:10

## 2021-10-09 RX ADMIN — DONEPEZIL HYDROCHLORIDE 10 MG: 10 TABLET, FILM COATED ORAL at 20:54

## 2021-10-09 RX ADMIN — NAFCILLIN SODIUM 1000 MG: 1 INJECTION, POWDER, FOR SOLUTION INTRAMUSCULAR; INTRAVENOUS at 08:14

## 2021-10-09 RX ADMIN — NAFCILLIN SODIUM 1000 MG: 1 INJECTION, POWDER, FOR SOLUTION INTRAMUSCULAR; INTRAVENOUS at 11:57

## 2021-10-09 RX ADMIN — AMPICILLIN SODIUM AND SULBACTAM SODIUM 1500 MG: 1; .5 INJECTION, POWDER, FOR SOLUTION INTRAMUSCULAR; INTRAVENOUS at 15:55

## 2021-10-09 RX ADMIN — POTASSIUM CHLORIDE 20 MEQ: 1500 TABLET, EXTENDED RELEASE ORAL at 08:10

## 2021-10-09 RX ADMIN — NAFCILLIN SODIUM 1000 MG: 1 INJECTION, POWDER, FOR SOLUTION INTRAMUSCULAR; INTRAVENOUS at 03:10

## 2021-10-09 RX ADMIN — SODIUM CHLORIDE, PRESERVATIVE FREE 10 ML: 5 INJECTION INTRAVENOUS at 08:11

## 2021-10-09 RX ADMIN — SODIUM CHLORIDE, PRESERVATIVE FREE 10 ML: 5 INJECTION INTRAVENOUS at 20:54

## 2021-10-09 RX ADMIN — ASPIRIN 81 MG: 81 TABLET, CHEWABLE ORAL at 08:10

## 2021-10-09 ASSESSMENT — PAIN DESCRIPTION - LOCATION: LOCATION: FACE

## 2021-10-09 ASSESSMENT — PAIN SCALES - GENERAL
PAINLEVEL_OUTOF10: 6
PAINLEVEL_OUTOF10: 0

## 2021-10-09 ASSESSMENT — PAIN DESCRIPTION - DESCRIPTORS: DESCRIPTORS: ACHING

## 2021-10-09 ASSESSMENT — PAIN DESCRIPTION - PAIN TYPE: TYPE: ACUTE PAIN

## 2021-10-09 NOTE — ED NOTES
Patient transferred to [facility] room 022 nurse informed.       Kenneth Hwang  10/08/21 5538

## 2021-10-09 NOTE — PROGRESS NOTES
Physician Progress Note      Rommel PERALTA #:                  [id number]  :                       1940  ADMIT DATE:       10/8/2021 11:41 AM  DISCH DATE:  RESPONDING  PROVIDER #:        Melo Petit MD          QUERY TEXT:    Patient admitted with facial cellulites and weakness, noted to have low WBC   2.9/3.5, RBC 3.9/4.13, Platelets 91/041, If possible, please document in   progress notes and discharge summary if you are evaluating and/or treating any   of the following: The medical record reflects the following:  Risk Factors: Facial cellulitis/erysipelas -  Clinical Indicators: low WBC 2.9/3.5, RBC 3.9/4.13, Platelets 28/527  Treatment: Lab monitoring and ID consult  Thank you. Please call if you have any questions. P) [phone]. Signed   by Keisha Gonzalez RN Clinical , CRCR  Options provided:  -- Pancytopenia  -- Pancytopenia with aplastic anemia  -- Other - I will add my own diagnosis  -- Disagree - Not applicable / Not valid  -- Disagree - Clinically unable to determine / Unknown  -- Refer to Clinical Documentation Reviewer    PROVIDER RESPONSE TEXT:    Patient has pancytopenia.     Query created by: Ruben Craig on 10/9/2021 7:14 AM      Electronically signed by:  Melo Petit MD 10/9/2021 11:59 AM

## 2021-10-09 NOTE — PROGRESS NOTES
Hospitalist Progress Note  [facility] Onc Med 5K       Patient: Amee Duarte  Unit/Bed: 5K-22/022-A  YOB: 1940  MRN: [id number]  Acct: [de-identified]   AdmittingDiagnosis: Erysipelas [A46]  Thrombocytopenia ([address] [D69.6]  Facial cellulitis [E46.935]  Leukopenia, unspecified type [D72.819]  Admit Date: 10/8/2021  Hospital Day: 1    Subjective:    contd pain, redness and swelling Face     Objective:   /60   Pulse 68   Temp 97.8 °F (36.6 °C) (Oral)   Resp 18   Ht 5' 4\" (1.626 m)   Wt 196 lb 6.4 oz (89.1 kg)   SpO2 94%   BMI 33.71 kg/m²     Intake/Output Summary (Last 24 hours) at 10/9/2021 1200  Last data filed at 10/9/2021 0439  Gross per 24 hour   Intake 61.46 ml   Output --   Net 61.46 ml     Physical Exam  Constitutional:       Appearance: Normal appearance. HENT:      Head: Normocephalic and atraumatic. Comments: Facial as noted by slightly worse since yesterday with significant tenderness bilateral submandibular lesion, I was unable to palpate any lymph nodes     Nose: Nose normal.      Mouth/Throat:      Mouth: Mucous membranes are moist.      Pharynx: Oropharynx is clear. Eyes:      Extraocular Movements: Extraocular movements intact. Pupils: Pupils are equal, round, and reactive to light. Neck:      Comments: Tenderness noted in the submandibular region bilaterally  Cardiovascular:      Rate and Rhythm: Normal rate. Pulses: Normal pulses. Pulmonary:      Effort: Pulmonary effort is normal.   Abdominal:      General: Bowel sounds are normal.      Palpations: Abdomen is soft. Musculoskeletal:         General: Normal range of motion. Skin:     General: Skin is warm. Neurological:      General: No focal deficit present. Mental Status: She is alert.        DVT Prophylaxis: Lovenox     Data:  CBC:   Lab Results   Component Value Date    WBC 3.5 10/09/2021    HGB 12.9 10/09/2021    HCT 39.6 10/09/2021    MCV 95.9 10/09/2021     10/09/2021     BMP: Lab Results   Component Value Date     10/09/2021    K 3.6 10/09/2021     10/09/2021    CO2 22 10/09/2021    BUN 13 10/09/2021    CREATININE 0.6 10/09/2021    CALCIUM 8.9 10/09/2021     ABGs: No results found for: PH, PCO2, PO2, HCO3, O2SAT  Troponin: No results found for: TROPONINI   LFTs   Lab Results   Component Value Date    AST 18 10/09/2021    ALT 14 10/09/2021    BILITOT 0.5 10/09/2021    ALKPHOS 56 10/09/2021          Imaging   CT Head WO Contrast    Result Date: 10/8/2021  PROCEDURE: CT HEAD WO CONTRAST CLINICAL INFORMATION: headache . TECHNIQUE: 2-D multiplanar noncontrast CT brain at 5 mm intervals. All CT scans at this facility use dose modulation, iterative reconstruction, and/or weight-based dosing when appropriate to reduce radiation dose to as low as reasonably achievable. COMPARISON: No prior study. FINDINGS: There is no hemorrhage. There is no infarction. There is no extra-axial fluid collection. Minimal bilateral subcortical white matter diminished attenuation. Ventricles are normal. Calvarium is intact. There is right periorbital soft tissue swelling. There is no intraorbital abnormality. The globe is intact. Visualized paranasal sinuses and mastoid air cells are clear. No acute intracranial pathology. Minimal right preorbital soft tissue swelling. **This report has been created using voice recognition software. It may contain minor errors which are inherent in voice recognition technology. ** Final report electronically signed by Dr. Darin Ortiz on 10/8/2021 3:21 PM    CT SOFT TISSUE NECK W CONTRAST    Result Date: 10/8/2021  PROCEDURE: CT SOFT TISSUE NECK W CONTRAST CLINICAL INFORMATION: right sided facial swelling and redness . TECHNIQUE: 2-D multiplanar postcontrast images of the neck with Isovue-370 IV contrast 3 mm intervals.  All CT scans at this facility use dose modulation, iterative reconstruction, and/or weight-based dosing when appropriate to reduce radiation dose to as low as reasonably achievable. COMPARISON: No prior study. FINDINGS: Minimal preorbital and supraorbital soft tissue swelling. Images of the base of the brain are unremarkable. Nasopharynx oropharynx and hypopharynx are unremarkable. There is marked thyromegaly with large left thyroid lobe which is intrathoracic extending into the mediastinum. Heterogeneous attenuation suggesting presence of cysts or nodules. This measures 6.8 cm in cephalocaudal extent and up to 2.6 cm in transverse  dimension. Epiglottis and aryepiglottic folds are normal. Parotid and submandibular glands are unremarkable. There are numerous nonenlarged cervical lymph nodes. Limited images of the upper chest show no infiltrates. No mediastinal or hilar adenopathy is seen although the hilar are incompletely imaged. No suspicious bone lesions     Minimal periorbital soft tissue swelling on the right. 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 **This report has been created using voice recognition software. It may contain minor errors which are inherent in voice recognition technology. ** Final report electronically signed by Dr. Darin Ortiz on 10/8/2021 3:25 PM      Assessment/Plan:  1. Facial cellulitis/erysipelas              -Appears a bit worse today we will change antibiotics to Unasyn and   consult ID specialist   2. Mild cognitive impairment              -Continue Aricept  3. Hypertension              -Under fair control continue amlodipine  4. Hyperlipidemia              -Continue Zetia and lovastatin  5.   Obstructive sleep apnea,               -Monitor and consider CPAP/BiPAP as needed      Electronically signed by Evelyn Ramirez MD on 10/9/2021 at 12:00 PM    Rounding Hospitalist

## 2021-10-10 LAB
BASOPHILS # BLD: 0.6 %
BASOPHILS ABSOLUTE: 0 THOU/MM3 (ref 0–0.1)
C-REACTIVE PROTEIN: 6.78 MG/DL (ref 0–1)
EOSINOPHIL # BLD: 4.1 %
EOSINOPHILS ABSOLUTE: 0.1 THOU/MM3 (ref 0–0.4)
ERYTHROCYTE [DISTWIDTH] IN BLOOD BY AUTOMATED COUNT: 13.1 % (ref 11.5–14.5)
ERYTHROCYTE [DISTWIDTH] IN BLOOD BY AUTOMATED COUNT: 46.2 FL (ref 35–45)
HCT VFR BLD CALC: 37.4 % (ref 37–47)
HEMOGLOBIN: 11.9 GM/DL (ref 12–16)
IMMATURE GRANS (ABS): 0.03 THOU/MM3 (ref 0–0.07)
IMMATURE GRANULOCYTES: 0.9 %
LYMPHOCYTES # BLD: 35.3 %
LYMPHOCYTES ABSOLUTE: 1.1 THOU/MM3 (ref 1–4.8)
MCH RBC QN AUTO: 30.6 PG (ref 26–33)
MCHC RBC AUTO-ENTMCNC: 31.8 GM/DL (ref 32.2–35.5)
MCV RBC AUTO: 96.1 FL (ref 81–99)
MONOCYTES # BLD: 14.1 %
MONOCYTES ABSOLUTE: 0.5 THOU/MM3 (ref 0.4–1.3)
NUCLEATED RED BLOOD CELLS: 0 /100 WBC
PLATELET # BLD: 120 THOU/MM3 (ref 130–400)
PMV BLD AUTO: 11 FL (ref 9.4–12.4)
RBC # BLD: 3.89 MILL/MM3 (ref 4.2–5.4)
SEDIMENTATION RATE, ERYTHROCYTE: 35 MM/HR (ref 0–20)
SEG NEUTROPHILS: 45 %
SEGMENTED NEUTROPHILS ABSOLUTE COUNT: 1.4 THOU/MM3 (ref 1.8–7.7)
WBC # BLD: 3.2 THOU/MM3 (ref 4.8–10.8)

## 2021-10-10 PROCEDURE — 36415 COLL VENOUS BLD VENIPUNCTURE: CPT

## 2021-10-10 PROCEDURE — 2580000003 HC RX 258: Performed by: INTERNAL MEDICINE

## 2021-10-10 PROCEDURE — 6360000002 HC RX W HCPCS: Performed by: INTERNAL MEDICINE

## 2021-10-10 PROCEDURE — 6370000000 HC RX 637 (ALT 250 FOR IP): Performed by: INTERNAL MEDICINE

## 2021-10-10 PROCEDURE — 85025 COMPLETE CBC W/AUTO DIFF WBC: CPT

## 2021-10-10 PROCEDURE — 85651 RBC SED RATE NONAUTOMATED: CPT

## 2021-10-10 PROCEDURE — 99233 SBSQ HOSP IP/OBS HIGH 50: CPT | Performed by: INTERNAL MEDICINE

## 2021-10-10 PROCEDURE — 86140 C-REACTIVE PROTEIN: CPT

## 2021-10-10 PROCEDURE — 1200000000 HC SEMI PRIVATE

## 2021-10-10 RX ADMIN — AMPICILLIN SODIUM AND SULBACTAM SODIUM 1500 MG: 1; .5 INJECTION, POWDER, FOR SOLUTION INTRAMUSCULAR; INTRAVENOUS at 22:19

## 2021-10-10 RX ADMIN — DONEPEZIL HYDROCHLORIDE 10 MG: 10 TABLET, FILM COATED ORAL at 20:11

## 2021-10-10 RX ADMIN — ASPIRIN 81 MG: 81 TABLET, CHEWABLE ORAL at 08:15

## 2021-10-10 RX ADMIN — SODIUM CHLORIDE, PRESERVATIVE FREE 10 ML: 5 INJECTION INTRAVENOUS at 15:49

## 2021-10-10 RX ADMIN — TORSEMIDE 20 MG: 20 TABLET ORAL at 08:15

## 2021-10-10 RX ADMIN — AMLODIPINE BESYLATE 10 MG: 10 TABLET ORAL at 08:18

## 2021-10-10 RX ADMIN — SODIUM CHLORIDE, PRESERVATIVE FREE 10 ML: 5 INJECTION INTRAVENOUS at 20:11

## 2021-10-10 RX ADMIN — SERTRALINE 100 MG: 100 TABLET, FILM COATED ORAL at 08:15

## 2021-10-10 RX ADMIN — AMPICILLIN SODIUM AND SULBACTAM SODIUM 1500 MG: 1; .5 INJECTION, POWDER, FOR SOLUTION INTRAMUSCULAR; INTRAVENOUS at 05:12

## 2021-10-10 RX ADMIN — PANTOPRAZOLE SODIUM 40 MG: 40 TABLET, DELAYED RELEASE ORAL at 05:51

## 2021-10-10 RX ADMIN — SODIUM CHLORIDE, PRESERVATIVE FREE 10 ML: 5 INJECTION INTRAVENOUS at 08:15

## 2021-10-10 RX ADMIN — AMPICILLIN SODIUM AND SULBACTAM SODIUM 1500 MG: 1; .5 INJECTION, POWDER, FOR SOLUTION INTRAMUSCULAR; INTRAVENOUS at 15:49

## 2021-10-10 RX ADMIN — AMPICILLIN SODIUM AND SULBACTAM SODIUM 1500 MG: 1; .5 INJECTION, POWDER, FOR SOLUTION INTRAMUSCULAR; INTRAVENOUS at 10:07

## 2021-10-10 RX ADMIN — SODIUM CHLORIDE, PRESERVATIVE FREE 10 ML: 5 INJECTION INTRAVENOUS at 10:07

## 2021-10-10 RX ADMIN — POTASSIUM CHLORIDE 20 MEQ: 1500 TABLET, EXTENDED RELEASE ORAL at 08:15

## 2021-10-10 ASSESSMENT — PAIN SCALES - GENERAL
PAINLEVEL_OUTOF10: 0

## 2021-10-10 NOTE — CONSULTS
CONSULTATION NOTE :ID       Patient - Scottie Aldridge,  Age - 80 y.o.    - 1940      Room Number - 5K-22/022-A   N -  [id number]   Acct # - [de-identified]  Date of Admission -  10/8/2021 11:41 AM  Patient's PCP: BRISA Reese CNP     Requesting Physician: Sara Walsh MD    REASON FOR CONSULTATION   Cellulitis of the face  CHIEF COMPLAINT   Fever with redness and swelling on the face of 4 days duration    HISTORY OF PRESENT ILLNESS       This is a very pleasant 80 y.o. female who was admitted to the hospital with a chief complaints of swelling and redness on the face involving bilateral cheek and the ear for 4 days duration. She was seen at [facility] and was discharged home. She went to her family doctor and was transferred here. It was swollen including her periorbital area associated with fever and pain. .  She denies any similar history before no dental issues. She has history of hypertension and hyperlipidemia no history of diabetes. She was started on IV Unasyn. Her soft tissue CT scan shows retrosternal goiter. She is feeling a little better. The swelling is much improved.     PAST MEDICAL  HISTORY       Past Medical History:   Diagnosis Date    Anxiety     Arthritis     Cataracts, bilateral     GERD (gastroesophageal reflux disease)     Hyperlipidemia     Hypertension     Sleep apnea        PAST SURGICAL HISTORY     Past Surgical History:   Procedure Laterality Date    BREAST BIOPSY      right    COLON SURGERY      colon resection due to cancerous polyp stage 1    COLONOSCOPY           MEDICATIONS:       Scheduled Meds:   ampicillin-sulbactam  1,500 mg IntraVENous Q6H    amLODIPine  10 mg Oral Daily    aspirin  81 mg Oral Daily    donepezil  10 mg Oral Nightly    pantoprazole  40 mg Oral QAM AC    [START ON 10/11/2021] rosuvastatin  5 mg Oral Once per day on Mon Thu    sertraline  100 mg Oral Daily    torsemide  20 mg Oral Daily    sodium chloride flush  10 mL IntraVENous 2 times per day    potassium chloride  20 mEq Oral Daily     Continuous Infusions:   sodium chloride       PRN Meds:bisacodyl, sodium chloride flush, sodium chloride, ondansetron **OR** ondansetron, polyethylene glycol, acetaminophen **OR** acetaminophen  Allergies:   ALLERGIES:    Morphine        SOCIAL HISTORY:     TOBACCO:   reports that she has never smoked. She has never used smokeless tobacco.     ETOH:   reports no history of alcohol use. Patient currently lives with family        FAMILY HISTORY:         Problem Relation Age of Onset    Heart Attack Mother     Heart Disease Father     Other Father         pneumonia    Liver Disease Sister     Heart Attack Brother        REVIEW OF SYSTEMS:     Constitutional: + Fever, no night sweats, no fatigue, no weight loss. Head: no head ache , no head injury, no migranes. Eye: no eye discharge, blurring of vision, no double vision,no eye pain. Ears: no hearing difficulty, no tinnitus as noted in HPI. Mouth/throat: no ulceration, dental caries , dysphagia, no hoarseness and voice change  Respiratory: no cough no chest pain,no shortness of breath,no wheezing  CVS: no palpitation, no chest pain,   GI: no abdominal pain, no nausea , no vomiting, no constipation,no diarrhea. HENNY: no dysuria, frequency and urgency, no hematuria, no kidney stones  Musculoskeletal: no joint pain, swelling , stiffness,  Endocrine: no polyuria, polydipsia, no cold or heat intolerance  Hematology: no anemia, no easy brusing or bleeding, no hx of clotting disorder  Dermatology: no skin rash, no skin lesions, no pruritis,  Neurological:no headaches,no dizziness, no seizure, no numbness.   Psychiatry: no depression, no anxiety,no panic attacks, no suicide ideation    PHYSICAL EXAM:     /65   Pulse 66   Temp 98.4 °F (36.9 °C) (Oral)   Resp 16   Ht 5' 4\" (1.626 m)   Wt 196 lb 6.4 oz (89.1 kg)   SpO2 95%   BMI 33.71 kg/m²   General apperance:  Awake, alert, not in distress. HEENT: pink conjunctiva, unicteric sclera, moist oral mucosa. There is redness on both sides of the face involving the nasolabial fold and the right ear is warm to touch and swollen. No open wound was noted, no drainage. The right ear was also red and swollen  Chest: Bilateral air entry  Cardiovascular:  RRR ,S1S2, no murmur or gallop. Abdomen:  Soft, non tender to palpation. Extremities: No skin rash. Skin:  Warm and dry. CNS: Oriented to person place and time        LABS:     CBC:   Recent Labs     10/08/21  1256 10/09/21  0514 10/10/21  0419   WBC 2.9* 3.5* 3.2*   HGB 12.3 12.9 11.9*   PLT 88* 106* 120*     BMP:    Recent Labs     10/08/21  1256 10/09/21  0514    139   K 3.7 3.6    104   CO2 24 22*   BUN 15 13   CREATININE 0.7 0.6   GLUCOSE 101 126*     Calcium:  Recent Labs     10/09/21  0514   CALCIUM 8.9    Hepatic:   Recent Labs     10/08/21  1256 10/08/21  1256 10/09/21  0514   ALKPHOS 55   < > 56   ALT 12   < > 14   AST 17   < > 18   PROT 7.2   < > 7.3   BILITOT 0.5   < > 0.5   BILIDIR <0.2  --   --    LABALBU 4.1   < > 4.0    < > = values in this interval not displayed. Amylase and Lipase:  Recent Labs     10/08/21  1255   LACTA 1.0     Lactic Acid:   Recent Labs     10/08/21  1255   LACTA 1.0        UA: No results for input(s): SPECGRAV, PHUR, COLORU, CLARITYU, MUCUS, PROTEINU, BLOODU, RBCUA, WBCUA, BACTERIA, NITRU, GLUCOSEU, BILIRUBINUR, UROBILINOGEN, KETUA, LABCAST, LABCASTTY, AMORPHOS in the last 72 hours. Invalid input(s): CRYSTALS      IMAGING:    Micro:   Lab Results   Component Value Date    BC No growth-preliminary  10/08/2021       Problem list of patient      Patient Active Problem List   Diagnosis Code    Erysipelas A46           Impression and Recommendation:   Erysipelas of the face: Continue current treatment.   We will switch her to oral amoxicillin and discharge home tomorrow  Treatment plan discussed with patient Thank you Ifeanyi Tafoya MD for allowing me to participate in this patient's care.     Maria Luisa Elder MD, MD,FACP 10/10/2021 10:13 AM

## 2021-10-10 NOTE — PROGRESS NOTES
Hospitalist Progress Note  [facility] Onc Med 5K       Patient: Ce Cuevas  Unit/Bed: 2Q-61/778-J  YOB: 1940  MRN: [id number]  Acct: [de-identified]   AdmittingDiagnosis: Erysipelas [A46]  Thrombocytopenia ([address] [D69.6]  Facial cellulitis [V78.653]  Leukopenia, unspecified type [D72.819]  Admit Date: 10/8/2021  Hospital Day: 2    Subjective:    Feels significantly better    Objective:   /65   Pulse 66   Temp 98.4 °F (36.9 °C) (Oral)   Resp 16   Ht 5' 4\" (1.626 m)   Wt 196 lb 6.4 oz (89.1 kg)   SpO2 95%   BMI 33.71 kg/m²     Intake/Output Summary (Last 24 hours) at 10/10/2021 1228  Last data filed at 10/9/2021 2054  Gross per 24 hour   Intake 353.18 ml   Output --   Net 353.18 ml     Physical Exam  HENT:      Head: Normocephalic and atraumatic. Right Ear: External ear normal.      Left Ear: External ear normal.      Nose: Nose normal.      Mouth/Throat:      Mouth: Mucous membranes are moist.      Pharynx: Oropharynx is clear. Eyes:      Conjunctiva/sclera: Conjunctivae normal.      Pupils: Pupils are equal, round, and reactive to light. Cardiovascular:      Rate and Rhythm: Normal rate. Pulses: Normal pulses. Pulmonary:      Effort: Pulmonary effort is normal.      Breath sounds: Normal breath sounds. Abdominal:      General: Bowel sounds are normal.      Palpations: Abdomen is soft. Musculoskeletal:         General: Normal range of motion. Cervical back: Normal range of motion. Skin:     General: Skin is warm. Capillary Refill: Capillary refill takes less than 2 seconds. Neurological:      Mental Status: She is alert. Mental status is at baseline.    Psychiatric:         Mood and Affect: Mood normal.       DVT Prophylaxis: lovenox    Data:  CBC:   Lab Results   Component Value Date    WBC 3.2 10/10/2021    HGB 11.9 10/10/2021    HCT 37.4 10/10/2021    MCV 96.1 10/10/2021     10/10/2021     BMP:   Lab Results   Component Value Date     10/09/2021    K 3.6 10/09/2021     10/09/2021    CO2 22 10/09/2021    BUN 13 10/09/2021    CREATININE 0.6 10/09/2021    CALCIUM 8.9 10/09/2021     ABGs: No results found for: PH, PCO2, PO2, HCO3, O2SAT  Troponin: No results found for: TROPONINI   LFTs   Lab Results   Component Value Date    AST 18 10/09/2021    ALT 14 10/09/2021    BILITOT 0.5 10/09/2021    ALKPHOS 56 10/09/2021          Imaging   CT Head WO Contrast    Result Date: 10/8/2021  PROCEDURE: CT HEAD WO CONTRAST CLINICAL INFORMATION: headache . TECHNIQUE: 2-D multiplanar noncontrast CT brain at 5 mm intervals. All CT scans at this facility use dose modulation, iterative reconstruction, and/or weight-based dosing when appropriate to reduce radiation dose to as low as reasonably achievable. COMPARISON: No prior study. FINDINGS: There is no hemorrhage. There is no infarction. There is no extra-axial fluid collection. Minimal bilateral subcortical white matter diminished attenuation. Ventricles are normal. Calvarium is intact. There is right periorbital soft tissue swelling. There is no intraorbital abnormality. The globe is intact. Visualized paranasal sinuses and mastoid air cells are clear. No acute intracranial pathology. Minimal right preorbital soft tissue swelling. **This report has been created using voice recognition software. It may contain minor errors which are inherent in voice recognition technology. ** Final report electronically signed by Dr. Pauline Baez on 10/8/2021 3:21 PM    CT SOFT TISSUE NECK W CONTRAST    Result Date: 10/8/2021  PROCEDURE: CT SOFT TISSUE NECK W CONTRAST CLINICAL INFORMATION: right sided facial swelling and redness . TECHNIQUE: 2-D multiplanar postcontrast images of the neck with Isovue-370 IV contrast 3 mm intervals. All CT scans at this facility use dose modulation, iterative reconstruction, and/or weight-based dosing when appropriate to reduce radiation dose to as low as reasonably achievable.  COMPARISON: No

## 2021-10-11 VITALS
HEART RATE: 72 BPM | WEIGHT: 196.4 LBS | OXYGEN SATURATION: 96 % | SYSTOLIC BLOOD PRESSURE: 127 MMHG | TEMPERATURE: 97.8 F | DIASTOLIC BLOOD PRESSURE: 59 MMHG | BODY MASS INDEX: 33.53 KG/M2 | HEIGHT: 64 IN | RESPIRATION RATE: 18 BRPM

## 2021-10-11 LAB
BASOPHILS # BLD: 0.7 %
BASOPHILS ABSOLUTE: 0 THOU/MM3 (ref 0–0.1)
C-REACTIVE PROTEIN: 3.52 MG/DL (ref 0–1)
EOSINOPHIL # BLD: 3.2 %
EOSINOPHILS ABSOLUTE: 0.1 THOU/MM3 (ref 0–0.4)
ERYTHROCYTE [DISTWIDTH] IN BLOOD BY AUTOMATED COUNT: 13 % (ref 11.5–14.5)
ERYTHROCYTE [DISTWIDTH] IN BLOOD BY AUTOMATED COUNT: 45.1 FL (ref 35–45)
HCT VFR BLD CALC: 38.2 % (ref 37–47)
HEMOGLOBIN: 12.5 GM/DL (ref 12–16)
IMMATURE GRANS (ABS): 0.05 THOU/MM3 (ref 0–0.07)
IMMATURE GRANULOCYTES: 1.2 %
LYMPHOCYTES # BLD: 30.3 %
LYMPHOCYTES ABSOLUTE: 1.2 THOU/MM3 (ref 1–4.8)
MCH RBC QN AUTO: 31 PG (ref 26–33)
MCHC RBC AUTO-ENTMCNC: 32.7 GM/DL (ref 32.2–35.5)
MCV RBC AUTO: 94.8 FL (ref 81–99)
MONOCYTES # BLD: 8.9 %
MONOCYTES ABSOLUTE: 0.4 THOU/MM3 (ref 0.4–1.3)
NUCLEATED RED BLOOD CELLS: 0 /100 WBC
PLATELET # BLD: 137 THOU/MM3 (ref 130–400)
PMV BLD AUTO: 10.6 FL (ref 9.4–12.4)
RBC # BLD: 4.03 MILL/MM3 (ref 4.2–5.4)
SEDIMENTATION RATE, ERYTHROCYTE: 55 MM/HR (ref 0–20)
SEG NEUTROPHILS: 55.7 %
SEGMENTED NEUTROPHILS ABSOLUTE COUNT: 2.2 THOU/MM3 (ref 1.8–7.7)
WBC # BLD: 4 THOU/MM3 (ref 4.8–10.8)

## 2021-10-11 PROCEDURE — 85025 COMPLETE CBC W/AUTO DIFF WBC: CPT

## 2021-10-11 PROCEDURE — 86140 C-REACTIVE PROTEIN: CPT

## 2021-10-11 PROCEDURE — 99239 HOSP IP/OBS DSCHRG MGMT >30: CPT | Performed by: INTERNAL MEDICINE

## 2021-10-11 PROCEDURE — 2580000003 HC RX 258: Performed by: INTERNAL MEDICINE

## 2021-10-11 PROCEDURE — 85651 RBC SED RATE NONAUTOMATED: CPT

## 2021-10-11 PROCEDURE — 6360000002 HC RX W HCPCS: Performed by: INTERNAL MEDICINE

## 2021-10-11 PROCEDURE — 36415 COLL VENOUS BLD VENIPUNCTURE: CPT

## 2021-10-11 PROCEDURE — 6370000000 HC RX 637 (ALT 250 FOR IP): Performed by: INTERNAL MEDICINE

## 2021-10-11 RX ORDER — AMOXICILLIN AND CLAVULANATE POTASSIUM 875; 125 MG/1; MG/1
1 TABLET, FILM COATED ORAL 2 TIMES DAILY
Qty: 10 TABLET | Refills: 0 | Status: SHIPPED | OUTPATIENT
Start: 2021-10-11 | End: 2021-10-16

## 2021-10-11 RX ADMIN — SODIUM CHLORIDE, PRESERVATIVE FREE 10 ML: 5 INJECTION INTRAVENOUS at 09:25

## 2021-10-11 RX ADMIN — POTASSIUM CHLORIDE 20 MEQ: 1500 TABLET, EXTENDED RELEASE ORAL at 09:25

## 2021-10-11 RX ADMIN — ROSUVASTATIN CALCIUM 5 MG: 10 TABLET, FILM COATED ORAL at 09:24

## 2021-10-11 RX ADMIN — AMPICILLIN SODIUM AND SULBACTAM SODIUM 1500 MG: 1; .5 INJECTION, POWDER, FOR SOLUTION INTRAMUSCULAR; INTRAVENOUS at 09:25

## 2021-10-11 RX ADMIN — AMLODIPINE BESYLATE 10 MG: 10 TABLET ORAL at 09:24

## 2021-10-11 RX ADMIN — AMPICILLIN SODIUM AND SULBACTAM SODIUM 1500 MG: 1; .5 INJECTION, POWDER, FOR SOLUTION INTRAMUSCULAR; INTRAVENOUS at 03:45

## 2021-10-11 RX ADMIN — ASPIRIN 81 MG: 81 TABLET, CHEWABLE ORAL at 09:25

## 2021-10-11 RX ADMIN — PANTOPRAZOLE SODIUM 40 MG: 40 TABLET, DELAYED RELEASE ORAL at 05:07

## 2021-10-11 RX ADMIN — TORSEMIDE 20 MG: 20 TABLET ORAL at 09:25

## 2021-10-11 RX ADMIN — SERTRALINE 100 MG: 100 TABLET, FILM COATED ORAL at 09:24

## 2021-10-11 ASSESSMENT — PAIN SCALES - GENERAL
PAINLEVEL_OUTOF10: 0

## 2021-10-11 NOTE — CARE COORDINATION
10/11/21, 1:12 PM EDT  DISCHARGE PLANNING EVALUATION:    Aileen Vital       Admitted: 10/8/2021/ [place] day: 3   Location: -22/022-A Reason for admit: Erysipelas [A46]  Thrombocytopenia ([address] [D69.6]  Facial cellulitis [V50.196]  Leukopenia, unspecified type [D72.819]   PMH:  has a past medical history of Anxiety, Arthritis, Cataracts, bilateral, GERD (gastroesophageal reflux disease), Hyperlipidemia, Hypertension, and Sleep apnea. Procedure: N/A  Barriers to Discharge:  Patient presents with facial pain and swelling. ID consult, home medications addressed, Unasyn, prn medications, daily labs, regular diet, activity as tolerated, up with assistance. PCP: BRISA Dsouza CNP  Readmission Risk Score: 8%    Patient Goals/Plan/Treatment Preferences: Met with Aroldo Ac, her  and daughter present at bedside. Aroldo Ac verifies her insurance and PCP. She can afford her medications and denies a need for DME. Her daughter will drive her home at discharge. Aroldo Ac is independent managing her health care needs, she declines HH. Transportation/Food Security/Housekeeping Addressed:  No issues identified. 10/11/21, 1:38 PM EDT    Patient goals/plan/ treatment preferences discussed by  and . Patient goals/plan/ treatment preferences reviewed with patient/ family. Patient/ family verbalize understanding of discharge plan and are in agreement with goal/plan/treatment preferences. Understanding was demonstrated using the teach back method. AVS provided by RN at time of discharge, which includes all necessary medical information pertaining to the patients current course of illness, treatment, post-discharge goals of care, and treatment preferences. IMM Letter  IMM Letter given to Patient/Family/Significant other/Guardian/POA/by[de-identified] Copy delivered to patient by mgr. Ring  IMM Letter date given[de-identified] 10/11/21  IMM Letter time given[de-identified] 5133

## 2021-10-11 NOTE — DISCHARGE SUMMARY
- 10.8 thou/*  Status: FinalRBC                                           Date: 10/08/2021Value: 3.90*       Ref range: 4.20 - 5.40 mill*  Status: FinalHemoglobin                                    Date: 10/08/2021Value: 12.3        Ref range: 12.0 - 16.0 gm/dl  Status: FinalHematocrit                                    Date: 10/08/2021Value: 37.4        Ref range: 37.0 - 47.0 %      Status: FinalMCV                                           Date: 10/08/2021Value: 95.9        Ref range: 81.0 - 99.0 fL     Status: [address]                                           Date: 10/08/2021Value: 31.5        Ref range: 26.0 - 33.0 pg     Status: [address]                                          Date: 10/08/2021Value: 32.9        Ref range: 32.2 - 35.5 gm/dl  Status: FinalRDW-CV                                        Date: 10/08/2021Value: 13.0        Ref range: 11.5 - 14.5 %      Status: FinalRDW-SD                                        Date: 10/08/2021Value: 45.5*       Ref range: 35.0 - 45.0 fL     Status: FinalPlatelets                                     Date: 10/08/2021Value: 88*         Ref range: 130 - 400 thou/m*  Status: FinalMPV                                           Date: 10/08/2021Value: 11.7        Ref range: 9.4 - 12.4 fL      Status: FinalSeg Neutrophils                               Date: 10/08/2021Value: 71.6        Ref range: %                  Status: FinalLymphocytes                                   Date: 10/08/2021Value: 16.7        Ref range: %                  Status: FinalMonocytes                                     Date: 10/08/2021Value: 10.8        Ref range: %                  Status: FinalEosinophils                                   Date: 10/08/2021Value: 0.3         Ref range: %                  Status: FinalBasophils                                     Date: 10/08/2021Value: 0.3         Ref range: %                  Status: FinalImmature Granulocytes                         Date: 10/08/2021Value: 0.3         Ref range: %                  Status: FinalPlatelet Estimate                             Date: 10/08/2021Value: SL DECREASED                   Ref range: Adequate           Status: FinalSegs Absolute                                 Date: 10/08/2021Value: 2.1         Ref range: 1 - 7 thou/mm3     Status: FinalLymphocytes Absolute                          Date: 10/08/2021Value: 0.5*        Ref range: 1.0 - 4.8 thou/m*  Status: FinalMonocytes Absolute                            Date: 10/08/2021Value: 0.3*        Ref range: 0.4 - 1.3 thou/m*  Status: FinalEosinophils Absolute                          Date: 10/08/2021Value: 0.0         Ref range: 0.0 - 0.4 thou/m*  Status: FinalBasophils Absolute                            Date: 10/08/2021Value: 0.0         Ref range: 0.0 - 0.1 thou/m*  Status: FinalImmature Grans (Abs)                          Date: 10/08/2021Value: 0.01        Ref range: 0.00 - 0.07 thou*  Status: FinalnRBC                                          Date: 10/08/2021Value: 0           Ref range: /100 wbc           Status: Final              Comment: Performed at [address] [place] [id number]                                        Date: 10/08/2021Value: 137         Ref range: 135 - 145 meq/L    Status: FinalPotassium reflex Magnesium                    Date: 10/08/2021Value: 3.7         Ref range: 3.5 - 5.2 meq/L    Status: FinalChloride                                      Date: 10/08/2021Value: 101         Ref range: 98 - 111 meq/L     Status: FinalCO2                                           Date: 10/08/2021Value: 24          Ref range: 23 - 33 meq/L      Status: FinalGlucose                                       Date: 10/08/2021Value: 101         Ref range: 70 - 108 mg/dL     Status: FinalBUN                                           Date: 10/08/2021Value: 15          Ref range: 7 - 22 mg/dL       Status: Dalia Enriquez Date: 10/08/2021Value: 0.7         Ref range: 0.4 - 1.2 mg/dL    Status: FinalCalcium                                       Date: 10/08/2021Value: 9.1         Ref range: 8.5 - 10.5 mg/dL   Status: Final              Comment: Performed at [facility], [address] Rate                                      Date: 10/08/2021Value: 34*         Ref range: 0 - 20 mm/hr       Status: Final              Comment: Performed at [address] [place] [address], [address]                                           Date: 10/08/2021Value: 11.00*      Ref range: 0.00 - 1.00 mg/dl  Status: Final              Comment: Performed at [address] [place] [address] [id number] Culture, Routine                        Date: 10/08/2021Value: No growth-preliminary                      Status: PreliminaryBlood Culture, Routine                        Date: 10/08/2021Value: No growth-preliminary                      Status: PreliminaryLactic Acid                                   Date: 10/08/2021Value: 1.0         Ref range: 0.5 - 2.0 mmol/L   Status: Final              Comment: Performed at [address] [facility], [address]                                       Date: 10/08/2021Value: 4.1         Ref range: 3.5 - 5.1 g/dL     Status: FinalTotal Bilirubin                               Date: 10/08/2021Value: 0.5         Ref range: 0.3 - 1.2 mg/dL    Status: FinalBilirubin, Direct                             Date: 10/08/2021Value: <0.2        Ref range: 0.0 - 0.3 mg/dL    Status: FinalAlkaline Phosphatase                          Date: 10/08/2021Value: 55          Ref range: 38 - 126 U/L       Status: FinalAST                                           Date: 10/08/2021Value: 17          Ref range: 5 - 40 U/L         Status: FinalALT                                           Date: 10/08/2021Value: 12          Ref range: 11 - 66 U/L        Status: FinalTotal Protein                                 Date: 10/08/2021Value: 7.2         Ref range: 6.1 - 8.0 g/dL     Status: Final              Comment: Performed at [address] [place] [address], [address]                                           Date: 10/08/2021Value: 0.990       Ref range: 0.400 - 4.200 uI*  Status: Final              Comment: Performed at [address] [place] [address], [address] [place]                                     Date: 10/08/2021Value: 12.0        Ref range: 8.0 - 16.0 meq/L   Status: Final              Comment: ANION GAP = Sodium -(Chloride + CO2)Performed at [address] [place] [address], [address]                               Date: 10/08/2021Value: 274.8*      Ref range: 275.0 - 300.0 mO*  Status: Final              Comment: Performed at [address] [place] [address], [facility]                           Date: 10/08/2021Value: 80*         Ref range: ml/min/1.73m2      Status: Final              Comment: Stage Description                    GFR, ml/min/1.73 m2 -   At increased risk               > or = 60 (with chronic                                     kidney disease risk factors) 1   Normal or increased GFR         > or = 90 2   Mildly or decreased GFR         60 - 89 3   Moderately decreased GFR        30 - 59 4   Severely decreased GFR          15 - 29 5   Kidney failure                  <15 (or dialysis)Estimated GFR calculated using abbreviated MDRD formula asrecommended by [employer]. Calculation basedupon serum creatinine and adjusted for age, gender & race. Thi. Internal Med., Vol. 139 (2) pg 137-147. Performed at [address] [place] [address], [address] Rate                                      Date: 10/09/2021Value: 28*         Ref range: 0 - 20 mm/hr       Status: Final              Comment: Performed at [facility] APRIL LEVIN [address], [address]                                           Date: 10/09/2021Value: 11.33*      Ref range: 0.00 - 1.00 mg/dl  Status: Final              Comment: Performed at [address] [place] [address]                                           Date: 10/09/2021Value: 3.5*        Ref range: 4.8 - 10.8 thou/*  Status: FinalRBC                                           Date: 10/09/2021Value: 4.13*       Ref range: 4.20 - 5.40 mill*  Status: FinalHemoglobin                                    Date: 10/09/2021Value: 12.9        Ref range: 12.0 - 16.0 gm/dl  Status: FinalHematocrit                                    Date: 10/09/2021Value: 39.6        Ref range: 37.0 - 47.0 %      Status: FinalMCV                                           Date: 10/09/2021Value: 95.9        Ref range: 81.0 - 99.0 fL     Status: [address] [facility]                                           Date: 10/09/2021Value: 31.2        Ref range: 26.0 - 33.0 pg     Status: [address]                                          Date: 10/09/2021Value: 32.6        Ref range: 32.2 - 35.5 gm/dl  Status: FinalRDW-CV                                        Date: 10/09/2021Value: 12.9        Ref range: 11.5 - 14.5 %      Status: FinalRDW-SD                                        Date: 10/09/2021Value: 45.9*       Ref range: 35.0 - 45.0 fL     Status: FinalPlatelets                                     Date: 10/09/2021Value: 106*        Ref range: 130 - 400 thou/m*  Status: FinalMPV                                           Date: 10/09/2021Value: 11.5        Ref range: 9.4 - 12.4 fL      Status: Final              Comment: Performed at [address] [place] [id number]                                       Date: 10/09/2021Value: 126*        Ref range: 70 - 108 mg/dL     Status: FinalCREATININE                                    Date: 10/09/2021Value: 0.6         Ref range: 0.4 - 1.2 mg/dL    Status: Say [place]ne[place] Date: 10/09/2021Value: 13          Ref range: 7 - 22 mg/dL       Status: FinalSodium                                        Date: 10/09/2021Value: 139         Ref range: 135 - 145 meq/L    Status: FinalPotassium reflex Magnesium                    Date: 10/09/2021Value: 3.6         Ref range: 3.5 - 5.2 meq/L    Status: FinalChloride                                      Date: 10/09/2021Value: 104         Ref range: 98 - 111 meq/L     Status: FinalCO2                                           Date: 10/09/2021Value: 22*         Ref range: 23 - 33 meq/L      Status: FinalCalcium                                       Date: 10/09/2021Value: 8.9         Ref range: 8.5 - 10.5 mg/dL   Status: FinalAST                                           Date: 10/09/2021Value: 18          Ref range: 5 - 40 U/L         Status: FinalAlkaline Phosphatase                          Date: 10/09/2021Value: 56          Ref range: 38 - 126 U/L       Status: FinalTotal Protein                                 Date: 10/09/2021Value: 7.3         Ref range: 6.1 - 8.0 g/dL     Status: FinalAlbumin                                       Date: 10/09/2021Value: 4.0         Ref range: 3.5 - 5.1 g/dL     Status: FinalTotal Bilirubin                               Date: 10/09/2021Value: 0.5         Ref range: 0.3 - 1.2 mg/dL    Status: FinalALT                                           Date: 10/09/2021Value: 14          Ref range: 11 - 66 U/L        Status: Final              Comment: Performed at [facility], [address] [place]                                     Date: 10/09/2021Value: 13.0        Ref range: 8.0 - 16.0 meq/L   Status: Final              Comment: ANION GAP = Sodium -(Chloride + CO2)Performed at [facility], [address] [facility] [address]                           Date: 10/09/2021Value: >90         Ref range: ml/min/1.73m2      Status: Final Comment: Stage Description                    GFR, ml/min/1.73 m2 -   At increased risk               > or = 60 (with chronic                                     kidney disease risk factors) 1   Normal or increased GFR         > or = 90 2   Mildly or decreased GFR         60 - 89 3   Moderately decreased GFR        30 - 59 4   Severely decreased GFR          15 - 29 5   Kidney failure                  <15 (or dialysis)Estimated GFR calculated using abbreviated MDRD formula asrecommended by [employer]. Calculation basedupon serum creatinine and adjusted for age, gender & race. Thi. Internal Med., Vol. 139 (2) pg 137-147. Performed at [address] [place] [address], [address] [place] [address]                                           Date: 10/10/2021Value: 3.2*        Ref range: 4.8 - 10.8 thou/*  Status: FinalRBC                                           Date: 10/10/2021Value: 3.89*       Ref range: 4.20 - 5.40 mill*  Status: FinalHemoglobin                                    Date: 10/10/2021Value: 11.9*       Ref range: 12.0 - 16.0 gm/dl  Status: FinalHematocrit                                    Date: 10/10/2021Value: 37.4        Ref range: 37.0 - 47.0 %      Status: FinalMCV                                           Date: 10/10/2021Value: 96.1        Ref range: 81.0 - 99.0 fL     Status: [address]                                           Date: 10/10/2021Value: 30.6        Ref range: 26.0 - 33.0 pg     Status: [address]                                          Date: 10/10/2021Value: 31.8*       Ref range: 32.2 - 35.5 gm/dl  Status: FinalRDW-CV                                        Date: 10/10/2021Value: 13.1        Ref range: 11.5 - 14.5 %      Status: FinalRDW-SD                                        Date: 10/10/2021Value: 46.2*       Ref range: 35.0 - 45.0 fL     Status: FinalPlatelets                                     Date: 10/10/2021Value: 120*        Ref range: 130 - 400 thou/m*  Status: FinalMPV                                           Date: 10/10/2021Value: 11.0        Ref range: 9.4 - 12.4 fL      Status: FinalSeg Neutrophils                               Date: 10/10/2021Value: 45.0        Ref range: %                  Status: FinalLymphocytes                                   Date: 10/10/2021Value: 35.3        Ref range: %                  Status: FinalMonocytes                                     Date: 10/10/2021Value: 14.1        Ref range: %                  Status: FinalEosinophils                                   Date: 10/10/2021Value: 4.1         Ref range: %                  Status: FinalBasophils                                     Date: 10/10/2021Value: 0.6         Ref range: %                  Status: FinalImmature Granulocytes                         Date: 10/10/2021Value: 0.9         Ref range: %                  Status: FinalSegs Absolute                                 Date: 10/10/2021Value: 1.4*        Ref range: 1 - 7 thou/mm3     Status: FinalLymphocytes Absolute                          Date: 10/10/2021Value: 1.1         Ref range: 1.0 - 4.8 thou/m*  Status: FinalMonocytes Absolute                            Date: 10/10/2021Value: 0.5         Ref range: 0.4 - 1.3 thou/m*  Status: FinalEosinophils Absolute                          Date: 10/10/2021Value: 0.1         Ref range: 0.0 - 0.4 thou/m*  Status: FinalBasophils Absolute                            Date: 10/10/2021Value: 0.0         Ref range: 0.0 - 0.1 thou/m*  Status: FinalImmature Grans (Abs)                          Date: 10/10/2021Value: 0.03        Ref range: 0.00 - 0.07 thou*  Status: FinalnRBC                                          Date: 10/10/2021Value: 0           Ref range: /100 wbc           Status: Final              Comment: Performed at [address] [place] [address], [address] [facility]                                           Date: 10/10/2021Value: 6.78*       Ref range: 0.00 - 1.00 mg/dl  Status: Final              Comment: Performed at [address] [facility], [address]                                      Date: 10/10/2021Value: 35*         Ref range: 0 - 20 mm/hr       Status: Final              Comment: Performed at [address] [facility], [address] [facility]                                           Date: 10/11/2021Value: 4.0*        Ref range: 4.8 - 10.8 thou/*  Status: FinalRBC                                           Date: 10/11/2021Value: 4.03*       Ref range: 4.20 - 5.40 mill*  Status: FinalHemoglobin                                    Date: 10/11/2021Value: 12.5        Ref range: 12.0 - 16.0 gm/dl  Status: FinalHematocrit                                    Date: 10/11/2021Value: 38.2        Ref range: 37.0 - 47.0 %      Status: FinalMCV                                           Date: 10/11/2021Value: 94.8        Ref range: 81.0 - 99.0 fL     Status: [address] [place] [place]                                           Date: 10/11/2021Value: 31.0        Ref range: 26.0 - 33.0 pg     Status: [address]                                          Date: 10/11/2021Value: 32.7        Ref range: 32.2 - 35.5 gm/dl  Status: FinalRDW-CV                                        Date: 10/11/2021Value: 13.0        Ref range: 11.5 - 14.5 %      Status: FinalRDW-SD                                        Date: 10/11/2021Value: 45.1*       Ref range: 35.0 - 45.0 fL     Status: FinalPlatelets                                     Date: 10/11/2021Value: 137         Ref range: 130 - 400 thou/m*  Status: FinalMPV                                           Date: 10/11/2021Value: 10.6        Ref range: 9.4 - 12.4 fL      Status: FinalSeg Neutrophils                               Date: 10/11/2021Value: 55.7        Ref range: %                  Status: FinalLymphocytes                                   Date: 10/11/2021Value: 30.3        Ref range: %                  Status: FinalMonocytes Date: 10/11/2021Value: 8.9         Ref range: %                  Status: FinalEosinophils                                   Date: 10/11/2021Value: 3.2         Ref range: %                  Status: FinalBasophils                                     Date: 10/11/2021Value: 0.7         Ref range: %                  Status: FinalImmature Granulocytes                         Date: 10/11/2021Value: 1.2         Ref range: %                  Status: FinalSegs Absolute                                 Date: 10/11/2021Value: 2.2         Ref range: 1 - 7 thou/mm3     Status: FinalLymphocytes Absolute                          Date: 10/11/2021Value: 1.2         Ref range: 1.0 - 4.8 thou/m*  Status: FinalMonocytes Absolute                            Date: 10/11/2021Value: 0.4         Ref range: 0.4 - 1.3 thou/m*  Status: FinalEosinophils Absolute                          Date: 10/11/2021Value: 0.1         Ref range: 0.0 - 0.4 thou/m*  Status: FinalBasophils Absolute                            Date: 10/11/2021Value: 0.0         Ref range: 0.0 - 0.1 thou/m*  Status: FinalImmature Grans (Abs)                          Date: 10/11/2021Value: 0.05        Ref range: 0.00 - 0.07 thou*  Status: FinalnRBC                                          Date: 10/11/2021Value: 0           Ref range: /100 wbc           Status: Final              Comment: Performed at [address] [place] [address], [address]                                           Date: 10/11/2021Value: 3.52*       Ref range: 0.00 - 1.00 mg/dl  Status: Final              Comment: Performed at [address] [place] [address], [address] Rate                                      Date: 10/11/2021Value: 54*         Ref range: 0 - 20 mm/hr       Status: Final              Comment: [facility], [address]------------    Radiology last 7 days:  CT Head WO ContrastResult Date: 10/8/2021No acute intracranial pathology. Minimal right preorbital soft tissue swelling. **This report has been created using voice recognition software. It may contain minor errors which are inherent in voice recognition technology. ** Final report electronically signed by Dr. Mamta Flores on 10/8/2021 3:21 PMCT SOFT TISSUE NECK W CONTRASTResult Date: 10/8/2021Minimal periorbital soft tissue swelling on the right. 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 **This report has been created using voice recognition software. It may contain minor errors which are inherent in voice recognition technology. ** Final report electronically signed by Dr. Mamta Flores on 10/8/2021 3:25 PM     Pending Labs   Order Current Status  Culture, Blood 1 Preliminary result  Culture, Blood 2 Preliminary result      Discharge Medications    Current Discharge Medication ListSTART taking these medicationsamoxicillin-clavulanate (AUGMENTIN) 875-125 MG per tabletTake 1 tablet by mouth 2 times daily for 5 daysQty: 10 tablet Refills: 0    Current Discharge Medication List    Current Discharge Medication ListCONTINUE these medications which have NOT CHANGEDOmega-3 Fatty Acids (FISH OIL) 1000 MG CAPSTake 1,000 mg by mouth dailydonepezil (ARICEPT) 10 MG tabletTake 10 mg by mouth nightlyraNITIdine (RANITIDINE 150 MAX STRENGTH) 150 MG tabletTake 150 mg by mouth 2 times dailyezetimibe (ZETIA) 10 MG tabletTake 10 mg by mouth dailyrosuvastatin (CRESTOR) 5 MG tabletTake 5 mg by mouth Twice a Weektorsemide (DEMADEX) 20 MG tabletTake 20 mg by mouth dailyPOTASSIUM CHLORIDE POTake 20 mEq by mouth dailyamLODIPine (NORVASC) 10 MG tabletTake 10 mg by mouth dailysertraline (ZOLOFT) 50 MG tabletTake 100 mg by mouth daily calcium carbonate 600 MG TABS tabletTake 2 tablets by mouth dailyaspirin 81 MG tabletTake 162 mg by mouth daily 2 tablets dailyMULTIPLE VITAMIN POTake by mouth dailyomeprazole (PRILOSEC) 20 MG delayed release capsuleTake 20 mg by mouth

## 2021-10-11 NOTE — PROGRESS NOTES
Progress note: Infectious diseases    Patient - Buck Claudio,  Age - 80 y.o.    - 1940      Room Number - 5K-22/022-A   MRN -  [id number]   Acct # - [de-identified]  Date of Admission -  10/8/2021 11:41 AM    SUBJECTIVE:   She is feeling better, plan for discharge today  OBJECTIVE   VITALS    height is 5' 4\" (1.626 m) and weight is 196 lb 6.4 oz (89.1 kg). Her oral temperature is 97.8 °F (36.6 °C). Her blood pressure is 127/59 (abnormal) and her pulse is 72. Her respiration is 18 and oxygen saturation is 96%.        Wt Readings from Last 3 Encounters:   10/08/21 196 lb 6.4 oz (89.1 kg)   17 198 lb 6.4 oz (90 kg)       I/O (24 Hours)    Intake/Output Summary (Last 24 hours) at 10/11/2021 1350  Last data filed at 10/11/2021 1311  Gross per 24 hour   Intake 2584.3 ml   Output --   Net 2584.3 ml       General Appearance  Awake, alert, oriented,  not  In acute distress  HEENT - normocephalic, atraumatic, pink conjunctiva,  anicteric sclera  Neck - Supple, no mass  The redness and swelling on the face is improved    MEDICATIONS:      ampicillin-sulbactam  1,500 mg IntraVENous Q6H    amLODIPine  10 mg Oral Daily    aspirin  81 mg Oral Daily    donepezil  10 mg Oral Nightly    pantoprazole  40 mg Oral QAM AC    rosuvastatin  5 mg Oral Once per day on     sertraline  100 mg Oral Daily    torsemide  20 mg Oral Daily    sodium chloride flush  10 mL IntraVENous 2 times per day    potassium chloride  20 mEq Oral Daily      sodium chloride       bisacodyl, sodium chloride flush, sodium chloride, ondansetron **OR** ondansetron, polyethylene glycol, acetaminophen **OR** acetaminophen      LABS:     CBC:   Recent Labs     10/09/21  0514 10/10/21  0419 10/11/21  0526   WBC 3.5* 3.2* 4.0*   HGB 12.9 11.9* 12.5   * 120* 137     BMP:    Recent Labs     10/09/21  0514      K 3.6      CO2 22*   BUN 13 CREATININE 0.6   GLUCOSE 126*     Calcium:  Recent Labs     10/09/21  0514   CALCIUM 8.9    Hepatic:   Recent Labs     10/09/21  0514   ALKPHOS 56   ALT 14   AST 18   PROT 7.3   BILITOT 0.5   LABALBU 4.0        CULTURES:   UA: No results for input(s): SPECGRAV, PHUR, COLORU, CLARITYU, MUCUS, PROTEINU, BLOODU, RBCUA, WBCUA, BACTERIA, NITRU, GLUCOSEU, BILIRUBINUR, UROBILINOGEN, KETUA, LABCAST, LABCASTTY, AMORPHOS in the last 72 hours.     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

## 2021-10-14 LAB
BLOOD CULTURE, ROUTINE: NORMAL
BLOOD CULTURE, ROUTINE: NORMAL

## 2021-10-19 NOTE — PROGRESS NOTES
Physician Progress Note      PATIENT:               Cayden Srivastava  CSN #:                  [id number]  :                       1940  ADMIT DATE:       10/8/2021 11:41 AM  DISCH DATE:        10/11/2021 2:51 PM  RESPONDING  PROVIDER #:        Mary Goel MD          QUERY TEXT:    Attending,    Pt admitted with erysipelas/facial cellulitis. Pt noted to have T-max 100.3,   WBC 2.9, CRP 11.0, sed rate 29, platelets 88, lactic acid wnl. If possible,   please respond below and document in the discharge summary if you were   evaluating and/or treating any of the following: The medical record reflects the following:  Risk Factors: erysipelas/facial cellulitis, advanced age  Clinical Indicators: T-max 100.3, WBC 2.9, CRP 11.0, sed rate 29, platelets   88, lactic acid wnl  Treatment: IV Unasyn, IV Nafcillin, Tylenol, labs, imaging, ID consult    Thank you! Mariana Hernandez RN, BSN, RHIT, CCDS  RN Clinical   [phone]  Options provided:  -- Sepsis, present on admission  -- Sepsis, NOT present on admission  -- Erysipelas and facial cellulitis without sepsis  -- Other - I will add my own diagnosis  -- Disagree - Not applicable / Not valid  -- Disagree - Clinically unable to determine / Unknown  -- Refer to Clinical Documentation Reviewer    PROVIDER RESPONSE TEXT:    This patient had erysipelas and facial cellulitis without sepsis.     Query created by: Iris Krueger on 10/19/2021 9:37 AM      Electronically signed by:  Mary Goel MD 10/19/2021 10:39 AM

## 2021-10-21 NOTE — PROGRESS NOTES
CLINICAL PHARMACY NOTE: MEDS TO BEDS    Total # of Prescriptions Filled: 1   The following medications were delivered to the patient:  Augmentin 875-125mg    Additional Documentation:

## 2025-07-24 ENCOUNTER — INPATIENT HOSPITAL (OUTPATIENT)
Dept: URBAN - METROPOLITAN AREA HOSPITAL 138 | Facility: HOSPITAL | Age: 85
End: 2025-07-24
Payer: MEDICARE

## 2025-07-24 DIAGNOSIS — K92.2 GASTROINTESTINAL HEMORRHAGE, UNSPECIFIED: ICD-10-CM

## 2025-07-24 DIAGNOSIS — R74.01 ELEVATION OF LEVELS OF LIVER TRANSAMINASE LEVELS: ICD-10-CM

## 2025-07-24 DIAGNOSIS — K83.8 OTHER SPECIFIED DISEASES OF BILIARY TRACT: ICD-10-CM

## 2025-07-24 DIAGNOSIS — K81.0 ACUTE CHOLECYSTITIS: ICD-10-CM

## 2025-07-24 DIAGNOSIS — E80.6 OTHER DISORDERS OF BILIRUBIN METABOLISM: ICD-10-CM

## 2025-07-24 DIAGNOSIS — K57.10 DIVERTICULOSIS OF SMALL INTESTINE WITHOUT PERFORATION OR ABS: ICD-10-CM

## 2025-07-24 DIAGNOSIS — R57.8 OTHER SHOCK: ICD-10-CM

## 2025-07-24 PROCEDURE — 43276 ERCP STENT EXCHANGE W/DILATE: CPT | Mod: GC | Performed by: INTERNAL MEDICINE

## 2025-07-24 PROCEDURE — 99223 1ST HOSP IP/OBS HIGH 75: CPT | Mod: 25,GC | Performed by: INTERNAL MEDICINE

## 2025-07-25 PROCEDURE — 99232 SBSQ HOSP IP/OBS MODERATE 35: CPT | Mod: GC | Performed by: INTERNAL MEDICINE

## 2025-07-26 ENCOUNTER — INPATIENT HOSPITAL (OUTPATIENT)
Dept: URBAN - METROPOLITAN AREA HOSPITAL 138 | Facility: HOSPITAL | Age: 85
End: 2025-07-26
Payer: MEDICARE

## 2025-07-26 DIAGNOSIS — K83.8 OTHER SPECIFIED DISEASES OF BILIARY TRACT: ICD-10-CM

## 2025-07-26 DIAGNOSIS — E80.6 OTHER DISORDERS OF BILIRUBIN METABOLISM: ICD-10-CM

## 2025-07-26 DIAGNOSIS — R74.01 ELEVATION OF LEVELS OF LIVER TRANSAMINASE LEVELS: ICD-10-CM

## 2025-07-26 PROCEDURE — 99232 SBSQ HOSP IP/OBS MODERATE 35: CPT | Mod: GC | Performed by: INTERNAL MEDICINE

## 2025-08-15 ENCOUNTER — OFFICE (OUTPATIENT)
Dept: URBAN - METROPOLITAN AREA CLINIC 18 | Age: 85
End: 2025-08-15
Payer: COMMERCIAL

## 2025-08-15 VITALS
DIASTOLIC BLOOD PRESSURE: 78 MMHG | WEIGHT: 186 LBS | HEIGHT: 64 IN | SYSTOLIC BLOOD PRESSURE: 118 MMHG | HEART RATE: 76 BPM

## 2025-08-15 DIAGNOSIS — K83.2 PERFORATION OF BILE DUCT: ICD-10-CM

## 2025-08-15 LAB
CBC, PLATELET CT  AND  DIFF: ABS BASOPHIL: 0 K/UL
CBC, PLATELET CT  AND  DIFF: ABS EOSINOPHIL: 0.1 K/UL
CBC, PLATELET CT  AND  DIFF: ABS IMMATURE GRANS: 0 K/UL
CBC, PLATELET CT  AND  DIFF: ABS LYMPHOCYTE: 1 K/UL
CBC, PLATELET CT  AND  DIFF: ABS MONOCYTE: 0.3 K/UL
CBC, PLATELET CT  AND  DIFF: ABS NEUTROPHIL: 1.6 K/UL — LOW
CBC, PLATELET CT  AND  DIFF: BASOPHIL: 1 %
CBC, PLATELET CT  AND  DIFF: DIFFERENTIAL: (no result)
CBC, PLATELET CT  AND  DIFF: EOSINOPHIL: 4.6 %
CBC, PLATELET CT  AND  DIFF: HEMATOCRIT: 31.5 % — LOW
CBC, PLATELET CT  AND  DIFF: HEMOGLOBIN: 10 G/DL — LOW
CBC, PLATELET CT  AND  DIFF: IMMATURE GRANULOCYTES: 0 %
CBC, PLATELET CT  AND  DIFF: LYMPHOCYTE: 33.6 %
CBC, PLATELET CT  AND  DIFF: MCH: 29.9 PG
CBC, PLATELET CT  AND  DIFF: MCHC: 31.7 G/DL
CBC, PLATELET CT  AND  DIFF: MCV: 94 FL
CBC, PLATELET CT  AND  DIFF: MONOCYTE: 8.8 %
CBC, PLATELET CT  AND  DIFF: MPV: 12 FL — HIGH
CBC, PLATELET CT  AND  DIFF: NEUTROPHIL: 52 %
CBC, PLATELET CT  AND  DIFF: NRBCS: 0 /100 WBC
CBC, PLATELET CT  AND  DIFF: PLATELET COUNT: 117 K/UL — LOW
CBC, PLATELET CT  AND  DIFF: RBC: 3.35 M/UL — LOW
CBC, PLATELET CT  AND  DIFF: RDW: 16 % — HIGH
CBC, PLATELET CT  AND  DIFF: WBC COUNT: 3.1 K/UL — LOW
COMPREHENSIVE METABOLIC PANEL: A/G RATIO: 1.7 RATIO
COMPREHENSIVE METABOLIC PANEL: ALBUMIN: 4 G/DL
COMPREHENSIVE METABOLIC PANEL: ALK PHOSPHATASE: 79 U/L
COMPREHENSIVE METABOLIC PANEL: ALT: 17 U/L
COMPREHENSIVE METABOLIC PANEL: AST: 17 U/L
COMPREHENSIVE METABOLIC PANEL: BILIRUBIN,TOTAL: 0.3 MG/DL
COMPREHENSIVE METABOLIC PANEL: BLOOD UREA NITROGEN: 14 MG/DL
COMPREHENSIVE METABOLIC PANEL: BUN/CREAT RATIO: 18
COMPREHENSIVE METABOLIC PANEL: CALCIUM: 7.7 MG/DL — LOW
COMPREHENSIVE METABOLIC PANEL: CHLORIDE: 106 MEQ/L
COMPREHENSIVE METABOLIC PANEL: CO2: 27 MEQ/L
COMPREHENSIVE METABOLIC PANEL: CREATININE: 0.8 MG/DL
COMPREHENSIVE METABOLIC PANEL: FASTING STATUS: (no result)
COMPREHENSIVE METABOLIC PANEL: GLOBULIN: 2.4 G/DL
COMPREHENSIVE METABOLIC PANEL: GLOMERULAR FILTRATION RATE (GFR): 73 MLS/MIN/1.73M2
COMPREHENSIVE METABOLIC PANEL: GLUCOSE,RANDOM: 104 MG/DL — HIGH
COMPREHENSIVE METABOLIC PANEL: POTASSIUM: 3.7 MEQ/L
COMPREHENSIVE METABOLIC PANEL: SODIUM: 142 MEQ/L
COMPREHENSIVE METABOLIC PANEL: TOTAL PROTEIN: 6.4 G/DL

## 2025-08-15 PROCEDURE — 99214 OFFICE O/P EST MOD 30 MIN: CPT | Performed by: INTERNAL MEDICINE
